# Patient Record
Sex: FEMALE | Race: WHITE | NOT HISPANIC OR LATINO | Employment: OTHER | URBAN - METROPOLITAN AREA
[De-identification: names, ages, dates, MRNs, and addresses within clinical notes are randomized per-mention and may not be internally consistent; named-entity substitution may affect disease eponyms.]

---

## 2019-11-21 ENCOUNTER — TELEPHONE (OUTPATIENT)
Dept: CONSULT | Facility: CLINIC | Age: 74
End: 2019-11-21

## 2019-12-03 NOTE — TELEPHONE ENCOUNTER
Spoke with patient following genetic counseling appointment she attended with her sister. Patient spoke with her insurance and is comfortable having GC only appointment to obtain informed consent for genetic testing for split hand and foot malformation. After the visit we will prior authorize genetic testing for this condition. Yi is comfortable with this plan.

## 2019-12-13 NOTE — TELEPHONE ENCOUNTER
Left non-detailed message on the patient's voicemail.     Plan to discuss:  - We discussed her case further and would like her to see both a  and genetic counselor. Since patient has medicare we know genetic testing is not well covered. Wanting to confirm that if not covered she would be willing to pay $600. Otherwise may be best to start with her son with different insurance that may be more likely to pay.

## 2019-12-20 NOTE — TELEPHONE ENCOUNTER
ROSSM to schedule Ritot appt       APPT notes: Genetics testing for personal and family HX of split hand and foot malformation -ok per Tori (LATESHA)        Thank you   Tavia

## 2020-07-27 ENCOUNTER — TELEPHONE (OUTPATIENT)
Dept: CONSULT | Facility: CLINIC | Age: 75
End: 2020-07-27

## 2020-07-30 ENCOUNTER — VIRTUAL VISIT (OUTPATIENT)
Dept: CONSULT | Facility: CLINIC | Age: 75
End: 2020-07-30
Attending: MEDICAL GENETICS
Payer: MEDICARE

## 2020-07-30 ENCOUNTER — VIRTUAL VISIT (OUTPATIENT)
Dept: CONSULT | Facility: CLINIC | Age: 75
End: 2020-07-30
Attending: GENETIC COUNSELOR, MS
Payer: MEDICARE

## 2020-07-30 DIAGNOSIS — Z84.89 FAMILY HISTORY OF GENETIC DISEASE: ICD-10-CM

## 2020-07-30 DIAGNOSIS — Q71.62: Primary | ICD-10-CM

## 2020-07-30 DIAGNOSIS — H90.3 SENSORINEURAL HEARING LOSS (SNHL) OF BOTH EARS: Primary | ICD-10-CM

## 2020-07-30 DIAGNOSIS — Q71.62: ICD-10-CM

## 2020-07-30 PROCEDURE — 96040 ZZH GENETIC COUNSELING, EACH 30 MINUTES: CPT | Mod: PN,ZF | Performed by: GENETIC COUNSELOR, MS

## 2020-07-30 RX ORDER — FLUTICASONE PROPIONATE 50 MCG
2 SPRAY, SUSPENSION (ML) NASAL
COMMUNITY
Start: 2019-09-25

## 2020-07-30 RX ORDER — ALENDRONATE SODIUM 70 MG/1
70 TABLET ORAL
COMMUNITY

## 2020-07-30 RX ORDER — AZELASTINE 1 MG/ML
1-2 SPRAY, METERED NASAL
COMMUNITY
Start: 2019-05-30

## 2020-07-30 RX ORDER — MONTELUKAST SODIUM 10 MG/1
10 TABLET ORAL
COMMUNITY
Start: 2019-05-30

## 2020-07-30 RX ORDER — ESTRADIOL 0.1 MG/G
1 CREAM VAGINAL
COMMUNITY
Start: 2019-11-04

## 2020-07-30 RX ORDER — SPIRONOLACTONE AND HYDROCHLOROTHIAZIDE 25; 25 MG/1; MG/1
1 TABLET ORAL
COMMUNITY
Start: 2020-06-25 | End: 2021-06-25

## 2020-07-30 RX ORDER — EPINEPHRINE 0.3 MG/.3ML
0.3 INJECTION SUBCUTANEOUS
COMMUNITY
Start: 2020-06-18

## 2020-07-30 RX ORDER — BUSPIRONE HYDROCHLORIDE 7.5 MG/1
7.5 TABLET ORAL
COMMUNITY
Start: 2020-05-06

## 2020-07-30 RX ORDER — IPRATROPIUM BROMIDE 21 UG/1
2 SPRAY, METERED NASAL
COMMUNITY
Start: 2019-09-30

## 2020-07-30 RX ORDER — BUSPIRONE HYDROCHLORIDE 15 MG/1
15 TABLET ORAL
COMMUNITY

## 2020-07-30 RX ORDER — TIZANIDINE HYDROCHLORIDE 2 MG/1
2 CAPSULE, GELATIN COATED ORAL
COMMUNITY

## 2020-07-30 RX ORDER — ATORVASTATIN CALCIUM 40 MG/1
40 TABLET, FILM COATED ORAL
COMMUNITY
Start: 2020-07-23

## 2020-07-30 RX ORDER — OXYBUTYNIN CHLORIDE 10 MG/1
10 TABLET, EXTENDED RELEASE ORAL
COMMUNITY
Start: 2019-09-20

## 2020-07-30 RX ORDER — FAMOTIDINE 20 MG/1
20 TABLET, FILM COATED ORAL
COMMUNITY
Start: 2019-11-26

## 2020-07-30 RX ORDER — GABAPENTIN 100 MG/1
CAPSULE ORAL
COMMUNITY
Start: 2020-06-01

## 2020-07-30 RX ORDER — HYDROCHLOROTHIAZIDE 25 MG/1
25 TABLET ORAL
COMMUNITY

## 2020-07-30 RX ORDER — ACETAMINOPHEN 325 MG/1
TABLET ORAL
COMMUNITY

## 2020-07-30 RX ORDER — MIRABEGRON 25 MG/1
25 TABLET, FILM COATED, EXTENDED RELEASE ORAL
COMMUNITY

## 2020-07-30 RX ORDER — CLINDAMYCIN HCL 300 MG
CAPSULE ORAL
COMMUNITY
Start: 2020-02-10

## 2020-07-30 RX ORDER — TIZANIDINE 2 MG/1
2 TABLET ORAL
COMMUNITY
Start: 2020-04-27

## 2020-07-30 RX ORDER — LOSARTAN POTASSIUM 100 MG/1
100 TABLET ORAL
COMMUNITY
Start: 2020-06-25

## 2020-07-30 NOTE — PROGRESS NOTES
Name:  Yi Cazares  :   1945  MRN:   [id number]  Date of service: 2020  Primary Provider: Castillo Su  Referring Provider: Castillo Su    PRESENTING INFORMATION   Reason for consultation:  A consultation in the [facility] was requested for Yi, a 74 year old female, for evaluation of ectrodactyly. Her unaffected sister, Nedra Ladd, was seen by Tori Hearn previously.     Yi was accompanied to this visit by her sister. Nedra.    History is obtained from Patient, electronic health record and sister. I met with the family at the request of Dr. Shipley to obtain a personal and family history, discuss possible genetic contributions to her symptoms, and to obtain informed consent for genetic testing.     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 She had additional webbing between the thumb and first finger that was released. She also has mild webbing of the right hand. Pictures were obtained today after a media release form was signed. She has hearing loss first noticed in her 40s and she started wearing a hearing aid in her 60s. She did note a history of ear infections requiring ear tubes in 3rd grade. She also has a history of a heart murmur, spondylolisthesis s/p surgeryx3, knee replacement and HTN.     There is no problem list on file for this patient.      Pertinent studies/abnormal test results:   None    Imaging results:   No results found for this or any previous visit (from the past 744 hour(s)).  No results found for any visits on 20.  No results found for this or any previous visit (from the past 8760 hour(s)).     Past Medical History:  No past medical history on file.    Past Surgical History:  No past surgical history on file.    Care team:  Patient Care Team       Relationship Specialty Notifications Start Castillo Salgado PCP - General Internal Medicine  3/13/20     Phone: [phone] Fax: [phone]          [address] [address]          She is coming in to discuss her family history of  split hand and foot malformation in her mother, son, and grandchildren. She has questions regarding recurrence risk.      Family History: A three generation pedigree was obtained and scanned into the electronic medical record. The relevant portions are described below:       Nieces/nephews: One male with psoriasis. One daughter without health/development concerns.     Siblings- sister, Nedra is a 68 yo female with a personal history of atopic dermatitis, osteopenia, osteoarthritis, and hyperlipidemia. She has two unaffected children    Son, Yuriy Cazares, 39y, ectrodactyly of right and left hands and clefting/webbing of right and left feet. He had a normal kidney evaluation.    Granddaughter, Ashley Cazares, 6y with ectrodactyly of right and left hands and clefting/webbing of right and left feet; normal kidney evaluation. Granddaughter, Cindy Cazares, 20 months, no limb malformations.     Grandson, Yuriy Cazares, 5.5 months, with ectrodactyly of right and left hands (no thumbs), missing radius, abnormal wrist joint preventing him from extension of hand, and clefting/webbing of right and left feet.     Mother-  at 85y from sepsis from a cold. H/o ectrodactyly of left hand and webbing of 3-4 fingers on right hand. H/o hearing loss when she was older, heart murmur, and venous problems of her legs.     Maternal Relatives- uncle  of MI. Two aunts had venous problems of their legs. Grandfather with h/o eye cancer of one eye s/p removal, and hearing loss. Grandmother with hearing loss and venous problems in legs    Father-  at 78y of metastatic prostate cancer with h/o glaucoma and multiple MI.     Paternal Relatives- Three aunts with breast cancer diagnosed in their 50s or 60s. One uncle with stomach cancer diagnosed in his late 40s. Another uncle  at 42 from MI. First cousin  of  a heart problem. Another first cousin with h/o stroke and multiple bone ?fractures (surgeries). Grandmother with DMI and h/o related deafness, blindness, and loss of sense of touch.      Family history is otherwise largely non-contributory. There were no other reports of limb, skeletal, cardiac, teeth, or skin anomalies. There were also no other reports of vision/hearing concerns, birth defects, developmental delay, intellectual disability, recurrent pregnancy loss, or stillbirth.      DISCUSSION AND ASSESSMENT  Today we reviewed that our genetic material or DNA is responsible for how our bodies grow and develop. It can be thought of as an instruction manual. This instruction manual is made up of chapters called genes. Our genes are inherited on structures called chromosomes, of which we have 23 pairs for a total of 46. For each chromosome pair, one copy is inherited from the mother and one is inherited from the father. The chromosome pairs are numbered from 1 to 22, and the 23rd pair of chromsomes is called the sex chromsomes. These determine if we are a male or female.     Changes in the DNA sequence of a gene can cause the signs and symptoms of a genetic condition because the instructions it is providing to the body have been altered. This can be a small spelling error in the gene, a large duplicated piece of information, or a large missing piece of information.      We discussed that split hand and foot malformation (SHFM) can be isolated, or syndromic. In syndromic forms there are other health concerns such as ectodermal dysplasia features, or hearing loss. SHFM has been documented to follow autosomal dominant, autosomal recessive, and X-linked inheritance patterns. Variable expressivity and incomplete penetrance have been reported with SHFM. Individuals with the same genetic change in a family can have varying degrees of limb anomalies, some may even be unaffected.       In Nedra's family there are four  generations with individuals who have some level of limb malformation. In addition, there is documented male to male transmission. This information provides evidence to support an autosomal dominant inheritance pattern. There is also a family history of hearing loss in the patient's sister and mother who have SHFM, but hearing loss is also present in maternal grandmother and grandfather who do not have SHFM. It is unclear if the hearing loss could be part of a SHFM syndrome with variable expressivity, or if it is a separate symptom that is associated with aging.       Nedra's concerns focused specifically on her children and the chances they could have a child with SHFM. The fact that Nedra and both her children are unaffected is reassuring and provides evidence supporting a low recurrence risk. However, due to incomplete penetrance we cannot completely rule out the possibility of recurrence based on lack of phenotypic features.      Genetic Testing  There is genetic testing available to test for known genetic causes of SHFM. Limb anomalies and reduction defects panel from [employer] that includes: ANKRD11, LMSIQG12, ARID1A, ARID1B, BHLHA9, BMP2, BMPR1B, CC2D2A, CDH3, WPJ671, CHSY1,DLL4, DLX5, DOCK6, DVL1, DVL3, **WBNE2U8, EOGT, ESCO2, FGF10, FGF16, FGFR1, FGFR2, FGFR3, GDF5, GLI3, GNAS, HDAC4, HDAC8, HOXD13, IHH, KIF7, KMT2A, LMBR1 (including ZRS regulatory region), LRP4, MGP, MKS1, MYCN, NIPBL, NOG, NOTCH1, NSDHL, PHF6, PIGV, PTHLH, RAD21, RBPJ, RECQL4, RBM8A, ROR2, CCJSFN7A, SALL1, SALL4, SHH, SMARCA2, SMARCA4, SMARCB1, SMARCE1, SMC1A, SMC3, SOX11, SOX9, TBX15, TBX3, TBX5, THPO, TP63, WNT10B, WNT3, WNT5A, WNT7A and deletion/duplication coverage for 10q24.    We discussed the benefits and limitations of doing this panel vs exome. Patient elected to proceed with exome.     Discussion  Whole Exome Sequencing  We spent time reviewing Yi s history, and that previous testing was not able to provide a specific  diagnosis or explanation for Yi s medical history.  We reviewed that based on the genetic testing results up to this point in time, we are not able to offer specific testing for a known condition for Yi.  However, we discussed broader testing through Whole Exome Sequencing (ALEKSEY) to look for a possible underlying cause for Yi's symptoms.    We discussed how ALEKSEY looks at the exome or the coding parts of the genes to look for gene changes that may explain Yi's symptoms.  ALEKSEY will also evaluated the DNA that is contained in the mitochondria (mtDNA).  We reviewed that ALEKSEY will not look at every part of the genome that can cause disease.  In addition, not all of the exons that are targeted by ALEKSEY will be covered or evaluated at a high enough level to accurately detect a disease causing mutation.  There are also limits to the types of disease-causing gene mutations that ALEKSEY can detect.  It is possible that a genetic cause for Yi's symptoms may be present and not detected by this test.     ALEKSEY Results  We reviewed that there are three types of results that can be obtained from ALEKSEY:    One possibility is a change(s) could be seen in Yi and this change(s) is known to cause similar symptoms to the symptoms Yi has experienced.  This is considered a positive result.  A positive result may provide more information on appropriate clinical management for Yi and may provide information on additional potential health risks associated with Yi's diagnosis.  A positive result can also have implications for the health and reproductive risks of other relatives.    It is also possible that no change(s) that are likely to explain Yi's symptoms are found from ALEKSEY.  This is considered a negative result.  A negative result would not completely rule out a possible genetic cause for Yi's symptoms. If negative, consider CNV analysis of 10q24.    Not all changes in our genes cause disease.  Sometimes, it can be difficult for  the laboratory to determine whether or not a change that is found contributes to the patient's symptoms.  If the meaning of a particular gene change is unknown, the lab classifies the result as a variant of unknown significance.    Familial Samples  We discussed that samples from Yi's family will be included in the analysis to help determine if gene changes that are found are disease causing or benign.  Only changes that are found in Yi that may contributed to her symptoms will be tested for in her relatives and only gene changes that the laboratory believes may contribute to Yi's symptoms will be reported. Genetic testing in relatives can lead to diagnoses, carrier status, or reveal family relationships (e.g. nonpaternity). Changes and variants in genes that are not thought to contribute to Yi's symptoms will not be included in the results report and will not be tested for in her relatives.     Children  Yuriy Cazares  1980 (affected)    Grandchildren  Ashley Cazares  10/24/2013 (affected)  Rosie Cazares  3/17/2018 (unaffected)  Yuriy Cazares  6/3/2019 (affected)    Sibling  Fawn Ladd  1952 (unaffected)    Niece/Nephew  Bee Martinsmons  10/14/1993 (unaffected)  Diallo Martinsmons  10/26/1981 (unaffected)    ACMG Secondary Findings  We reviewed that the lab can report the results of gene mutations that are found in genes recommended by the American College of Medical Genetics and Genomics (ACMG) to be reported to ALEKSEY patients even if the gene variant does not contribute to their current symptoms.  Many of these gene changes may not be associated with symptoms until adulthood and are not traditionally tested for in children, but may lead to medical management changes. Examples include genes related to increased cancer risk and heart arrhythmias. In addition, relative status for a change in one of the secondary findings genes may sought from Yi's results.   "    We discussed that there are insurance implications related to these findings in terms of life, short term disability, and long term disability insurance. There is a federal law in place at the moment, The Genetic Information Nondiscrimination Act or PINA (2008) that protects again health insurance discrimination.  Health insurance protections do not apply to members of the [place]  who receive care through [employer], Veterans receiving care through the VA, the [facility], or federal employees who receive care through Federal Employees Health Benefits Plan. Employers may not discriminate (hiring, firing, promotions etc.), based on genetic information. This only applies to companies with 15 or more employees. It does not apply to federal employees, or , which have their own nondiscrimination protections in place. Employers may have \"voluntary\" health services such as employee wellness programs that request genetic information or family history, which is not a violation of PINA.   At this time, the family decided to decline receiving the results from the ACMG secondary findings.     Benefits Investigation and Initiating Testing  We reviewed the potential costs of ALEKSEY and discussed that the lab will look into the costs of testing through the family's insurance on their behalf.  This is called an estimation of benefits. We reviewed that they will be contacted by [employer] with this information, but they should also check this information with their insurance company. This estimation is not guaranteed. The family has the right to decline to proceed with testing based on the benefits investigation. If [facility] does not receive permission from the family to proceed with testing, testing will not be initiated. If the benefits investigation is too high for the family, [facility] offers financial assistance based on house-hold income and household size. They may also switch to the patient-pay price of $2500, " "which can be paid over 24 months.  If the estimation of benefits is <$100, the family will not be contacted and testing will be automatically initiated.     Family History of Cancer   The family history of prostate, breast, and stomach cancer is concerning for an inherited genetic risk for cancer. While the majority of cancer is sporadic in nature, some families carry a genetic predisposition to cancer. These families have a clustering of cancer in the family and/or early ages of onset. Along with the cancer types already seen in the family, the risk for other types of cancer may also be increased. We discussed the purpose of genetic testing and the changes in management that could be made based on genetic testing, personal, and family history. We discussed that the best individual to test is an individual who has been diagnosed with cancer. We discussed the Cancer Risk Management Program, and their contact information was provided if relatives are interested.  They can ask for a referral from their primary care provider.    Cancer Risk Management  o [phone]  o [email or web address]     If relatives are not local and need assistance finding a genetic counselor in their area, they can use the [email or web address] \"find a genetic counselor\" link.    Plan:  1. The purpose and process of whole exome sequencing (ALEKSEY) was reviewed today.  2. After reviewing the risks, benefits, and limitations of genetic testing, consent was obtained for whole exome sequencing for Yi.  A buccal sample. Yi's sister, Nedra, parents consented to providing a buccal sample to assist in the interpretation of Yi's results. I will call other relatives 8/5/2020 to consent for testing.  They are aware that they will not be charged for the parental studies; however, a bill may be received for lab services.  Orders Placed This Encounter   Procedures     SEND OUT to [facility] for XomeDxPlus Trio Test " Code 561a DO NOT BILL PATIENT: Laboratory Miscellaneous Order     3. Results are expected in 4-6 months, pending [facility]'s benefits investigation. These will be disclosed over the phone, and a follow up appointment will be made to discuss results in further detail.  4. Contact information was provided to the family.  Additional questions or concerns were denied.      Kamilah Alonso [facility]  Genetic Counselor   [facility]   Phone: [phone]  Pager: [phone]  Email: [email or web address]        Approximate Time Spent in Consultation: 90min      Addendum 8/5/2020: consented Diallo for testing (Fawn's son). Yuriy and Bee were not available by phone. I will continue to try to reach them.    Addendum 8/13/2020: consented Yuriy Cazares for testing along with his three children, Ashley, Cindy, and Yuriy Cazares. Consented Bee Ladd for testing.    Addendum 8/25/2020: received VM from Yi stating they are sending in the buccals. Also confirmed that I have received her letter with suggested family history edits. Edits made above.    CC: Patient

## 2020-07-30 NOTE — PROGRESS NOTES
GENETICS CLINIC CONSULTATION     Name:  Yi Cazares  :   1945  MRN:   [id number]  Date of service: 2020  Primary Care Provider: Castillo Su  Referring Provider: Castillo Su    Dear Dr. Castillo Su,     We had the pleasure of seeing your patient in Genetics Clinic today via video visit.     Reason for consultation:  A consultation in the [facility] was requested for Yi, a 74 year old female, for evaluation of congenital hand malformation.     Yi was accompanied to this visit by her sister. She also saw our genetic counselor at this visit.       History is obtained from Patient, electronic health record and sister.    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 Her family has been affected for 4 generations and they are concerned that the condition is worsening as the generations progress and are additionally worried that unaffected individuals may be carriers. We discussed the pattern of inheritance in the family is most consistent with AD.    Ectrodactyly or split-hand/foot malformations (SHFM) is a rare congenital malformation of the limbs, involving mostly the central rays of the autopods, median clefts in hands and feet, syndactyly, and metacarpal, metatarsal and phalangeal aplasia or hypoplasia. The hands and/or feet appear split into 2 halves with aplasia (failure of development) of the phalanx, metacarpal, and/or metatarsal bones of one or more fingers and/or toes as well as hypoplasia (underdevelopment) of the phalanges, metacarpals, and metatarsals (the bones leading up to the toes). SHFM is inherited as an autosomal dominant, recessive, or X-linked entity, and its clinical severity varies from patient to patient as well as between the limbs of the same patient. SHFM is a genetically heterogeneous disorder with variable expressivity inherited as syndromic and nonsyndromic forms.    We discussed the  different options between obtaining a NGS panel looking for known genes (DLX5/DLX6/DSS1, HOX11, HOXD13, TP63, LOUISA, WNT10B, BHLHA9) associated with split hand/ foot malformations vs. exome sequencing with Yi as proband and some other affected and unaffected family members that will help with seggregation. Genetic counseling was provided by the  and advantages and disadvantages of each test were discussed in detail. At this time the family would like to pursue exome sequencing.    Yi also inquire about any gene therapy. I discussed there was none at this time, but option of PGD once we have the familial mutation was discussed in detail.     Plan:    1. Ordered at this visit: none   2. Genetic testing: Prior-auth for exome sequencing.   3. Genetic counseling consultation with Kamilah Alonso MS, [facility] to update pedigree, provide genetic counseling regarding ectrodactyly, obtain consent for genetic testing and coordinate testing of family members  4. Follow up:  Return in about 6 months (around 1/30/2021).    References:  https://www.[employer]/Article/FullText/123362#:~:text=Ectrodactyly%20or%20split%2Dhand%2Ffoot,and%20phalangeal%20aplasia%20or%20hypoplasia.  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 She has had multiple surgeries on her hands. She was told in 1968 that her condition was autosomal dominant when she went to [facility], but their family has been told more recently that this condition may skip generations so they are seeking further genetic testing to better understand their condition and to see if future generations may be affected even if they don't appear symptomatic.     They are concerned that the condition is getting severe through the affected generations. Yi and her mom both have their left hand affected. Yi's son, along with two of her  "grandchildren have both hands and feet affected. She also has an unaffected grandchild. Yi's sister Nedra is on the call today and has also been seen by a genetic counselor before. She and her two children are unaffected. Nedra is interested to see if her future grandchildren are at risk. They desire testing for many of the affected and nonaffected family members.    Yi denies personal history of delayed development, early onset hearing loss or foot malformation.     Developmental/Educational History:  Yi denies any history of development issues or cognitive delays.   Yi got her doctorate in education.     Pregnancy/ History:  Yi was born via vaginal delivery  Pregnancy complications included \"blue at birth\" but did not need to stay longer a typical baby.     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 - Second toe removed in both of feet.   - right knee replaced  - left knee shattered and woven back together  - cataract surgery    Medications:  Current Outpatient Medications   Medication Sig Dispense Refill     acetaminophen (TYLENOL) 325 MG tablet        albuterol (PROAIR RESPICLICK) 108 (90 Base) MCG/ACT inhaler Inhale 2 puffs into the lungs       alendronate (FOSAMAX) 70 MG tablet Take 70 mg by mouth       atorvastatin (LIPITOR) 40 MG tablet Take 40 mg by mouth       azelastine (ASTELIN) 0.1 % nasal spray 1-2 sprays       busPIRone (BUSPAR) 15 MG tablet Take 15 mg " "by mouth       busPIRone (BUSPAR) 7.5 MG tablet Take 7.5 mg by mouth       cholecalciferol 25 MCG (1000 UT) TABS Take 1,000 Units by mouth       clindamycin (CLEOCIN) 300 MG capsule Take 2 capsules by mouth 1-2 hours prior to a dental procedure.       EPINEPHrine (ANY BX GENERIC EQUIV) 0.3 MG/0.3ML injection 2-pack Inject 0.3 mg into the muscle       estradiol (ESTRACE) 0.1 MG/GM vaginal cream Place 1 g vaginally       famotidine (PEPCID) 20 MG tablet Take 20 mg by mouth       fluticasone (FLONASE) 50 MCG/ACT nasal spray 2 sprays       gabapentin (NEURONTIN) 100 MG capsule TAKE TWO CAPSULES BY MOUTH TWICE A DAY       hydrochlorothiazide (HYDRODIURIL) 25 MG tablet Take 25 mg by mouth       ipratropium (ATROVENT) 0.03 % nasal spray 2 sprays       losartan (COZAAR) 100 MG tablet Take 100 mg by mouth       mirabegron (MYRBETRIQ) 25 MG 24 hr tablet Take 25 mg by mouth       montelukast (SINGULAIR) 10 MG tablet Take 10 mg by mouth       omeprazole (PRILOSEC) 20 MG DR capsule TAKE ONE CAPSULE BY MOUTH TWICE A DAY, 1 HOUR BEFORE A MEAL       oxybutynin ER (DITROPAN-XL) 10 MG 24 hr tablet Take 10 mg by mouth       ranitidine (ZANTAC) 150 MG tablet Take 150 mg by mouth       spironolactone-HCTZ (ALDACTAZIDE) 25-25 MG tablet Take 1 tablet by mouth       tiZANidine (ZANAFLEX) 2 MG capsule Take 2 mg by mouth       tiZANidine (ZANAFLEX) 2 MG tablet Take 2 mg by mouth         Allergies:  Allergies   Allergen Reactions     Bee Venom Anaphylaxis     Moxifloxacin Hives     Oxycodone Anaphylaxis     Shellfish Allergy Anaphylaxis and Hives     Amlodipine Other (See Comments)     Leg swelling      Cyclosporine Unknown and Other (See Comments)     Painful redness on lids        Doxazosin Other (See Comments)     intolerance  Ankle swelling       Hydrocodone-Acetaminophen Other (See Comments)     Mushroom Extract Complex GI Disturbance     Neosporin Hives     \"Many antibiotics\"     No Clinical Screening - See Comments Hives     Narcotic " and synthetic narcotics.     Tramadol Nausea and Vomiting     Wasp Venom Protein Hives     Venom of stinging insects     Codeine Rash and Nausea and Vomiting     Morphine Rash, Hives, Unknown and Nausea     vicodin  Redness in face  REDNESS IN FACE       Propoxyphene Swelling and Rash     Redness  DARVON       Rofecoxib Other (See Comments) and Rash     STOMACH UPSET       Sulfa Drugs Rash       Diet:  Regular     Care team:  Patient Care Team:  Castillo Su PCP - General (Internal Medicine)    Review of Systems:  GENERAL:  Endorses occasional headache.  EYES:  Endorses hx of cataract surgery. Uses glasses.   EARS: high frequency hearing loss  NOSE/MOUTH/THROAT: Denies: Epistaxis, Difficulty swallowing, facial weakness  RESPIRATORY: Denies: Cough, shortness of breath  CARDIOVASCULAR: Denies: chest pain  GASTROINTESTINAL: nausea with fatty foods. Denies diarrhea, vomiting, constipation   MUSCULOSKELETAL:  Denies: fractures, see HPI and surgical history  RENAL/URINARY: frequent UTIs in past, but treated with medication and resolved. Denies: Hx of kidney stones,  Hematuria,    NEUROLOGICAL: Denies: Hx of seizures or tremor  ENDOCRINE: Denies: Thyroid problems  INTEGUMENT: Denies: Birthmarks, Rashes, pigmentation issues, Poor wound healing    Family History:    A detailed pedigree was obtained by the genetic counselor at the time of Nedra's appointment and is scanned into the electronic medical record. I personally reviewed and discussed the pedigree with the GC and the family and concur with the GC note. Please refer to the formal pedigree for full details.     Yi and her mom both have their left hand affected. Yi's son, along with two of her grandchildren have both hands and feet affected . Yi's sister Nedra is on the call today and has also been seen by a genetic counselor before. She and her two children are unaffected.      Son- 39y, ectrodactyly of right and left hands and clefting/webbing of right  and left feet. He had a normal kidney evaluation.    Granddaughter/ grandson- grand daughter, 6y with ectrodactyly of right and left hands and clefting/webbing of right and left feet; normal kidney evaluation. Another grand daughter, 20 months, atopic dermatitis and no limb malformations. Grandson, 5.5 months, with ectrodactyly of right and left hands (no thumbs), missing radius, abnormal wrist joint preventing him from extension of hand, and clefting/webbing of right and left feet.    Mother-  at 85y from sepsis from a cold. H/o ectrodactyly of left hand and webbing of 3-4 fingers on right hand. H/o hearing loss when she was older requiring hearing aids, heart murmur, and venous problems of her legs.       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 Now 's grandchildren.    Physical Examination:  There were no vitals taken for this visit.  Weight %tile:Normalized weight-for-age data not available for patients older than 20 years.  Height %tile: Normalized stature-for-age data not available for patients older than 20 years.  Head Circumference %tile: No head circumference on file for this encounter.  BMI %tile: No height and weight on file for this encounter.    Pictures taken during the visit: no pictures sent ahead of time via e-mail.     GENERAL: Healthy, alert and no distress  EYES: Eyes grossly normal to inspection.  No discharge or erythema, or obvious scleral/conjunctival abnormalities. Uses glasses.   NOSE: normal, no discharge  RESP: No audible wheeze, cough, or visible cyanosis.  No visible retractions or increased work of breathing.    SKIN: Visible skin clear. No significant rash, abnormal pigmentation or lesions.  NEURO: Cranial nerves grossly intact.  Mentation and speech appropriate for age.  Extremities/Musculoskeletal:      Right hand: Cutaneous webbing between third and fourth fingers of right hand.     Left hand: normal fifth digit. Third and fourth finger fused,  prominent knuckle with time, Index finger extends only to first knuckle. Thumb is smaller than    right hand thumb, but functions fine. Web between thumb and first finger was cut at 18 years old to improve function.   PSYCH: Mentation appears normal, affect normal/bright, judgement and insight intact, normal speech and appearance well-groomed.    Genetic testing done to date:  none    Pertinent lab results:   N/A    Imaging results:  none    Thank you for allowing us to participate in the care of Yi Cazares. Please do not hesitate to contact us with questions.    Monet Hopper, MS4      Physician Attestation   I, Lidia Shipley, was present with the medical student who participated in the service and in the documentation of the note.  I have edited the note, verified the history and personally performed the physical exam and medical decision making.  I agree with the assessment and plan of care as documented in the note.      Items personally reviewed: growth parameters, labs and imaging and agree with the interpretation documented in the note.    I spent a total of 60 minutes face-to-face with Yi Cazares and her sister during today's video visit.     Lidia Shipley MD    Division of Genetics and Metabolism  Department of Pediatrics      Route to  Patient Care Team:  Castillo Su as PCP - General (Internal Medicine)      Video-Visit Details     Type of service:  Video Visit     Video Start Time: 2:35 PM    Video End Time (time video stopped): 3:35 PM    Originating Location (pt. Location): Patient states she is in [place] for the visit     Distant Location (provider location):  PEDS GENETICS      Mode of Communication:  Video Conference via [employer]

## 2020-07-30 NOTE — LETTER
2020      RE: Yi Cazares  [address] [facility] [address]       Name:  Yi Cazares  :   1945  MRN:   [id number]  Date of service: 2020  Primary Provider: Castillo Su  Referring Provider: Castillo Su    PRESENTING INFORMATION   Reason for consultation:  A consultation in the [facility] was requested for Yi, a 74 year old female, for evaluation of ectrodactyly. Her unaffected sister, Nedra Ladd, was seen by Tori Hearn previously.     Yi was accompanied to this visit by her sister. Nedra.    History is obtained from Patient, electronic health record and sister. I met with the family at the request of Dr. Shipley to obtain a personal and family history, discuss possible genetic contributions to her symptoms, and to obtain informed consent for genetic testing.     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 She had additional webbing between the thumb and first finger that was released. She also has mild webbing of the right hand. Pictures were obtained today after a media release form was signed. She has hearing loss first noticed in her 40s and she started wearing a hearing aid in her 60s. She did note a history of ear infections requiring ear tubes in 3rd grade. She also has a history of a heart murmur, spondylolisthesis s/p surgeryx3, knee replacement and HTN.     There is no problem list on file for this patient.      Pertinent studies/abnormal test results:       Imaging results:   No results found for this or any previous visit (from the past 744 hour(s)).  No results found for any visits on 20.  No results found for this or any previous visit (from the past 8760 hour(s)).     Past Medical History:  No past medical history on file.    Past Surgical History:  No past surgical history on file.    Care team:  Patient Care Team       Relationship Specialty Notifications Start End    Castillo Su PCP -  General Internal Medicine  3/13/20     Phone: [phone] Fax: [phone]         [address] [facility] [address]          She is coming in to discuss her family history of  split hand and foot malformation in her mother, sister, nephew, and great niece and nephew. She has questions regarding recurrence risk for her unaffected children and great niece.      Family History: A three generation pedigree was obtained and scanned into the electronic medical record. The relevant portions are described below:       Nieces/nephews: One male with psoriasis. One daughter without health/development concerns.     Siblings- sister, Nedra is a 66 yo female with a personal history of atopic dermatitis, osteopenia, osteoarthritis, and hyperlipidemia. She has two unaffected children    Son 39y, ectrodactyly of right and left hands and clefting/webbing of right and left feet. He had a normal kidney evaluation.    Granddaughter, 6y with ectrodactyly of right and left hands and clefting/webbing of right and left feet; normal kidney evaluation. Granddaughter, 20 months, atopic dermatitis and no limb malformations. Grandson, 5.5 months, , with ectrodactyly of right and left hands (no thumbs), missing radius, abnormal wrist joint preventing him from extension of hand, and clefting/webbing of right and left feet.     Mother-  at 85y from sepsis from a cold. H/o ectrodactyly of left hand and webbing of 3-4 fingers on right hand. H/o hearing loss when she was older requiring hearing aids, heart murmur, and venous problems of her legs.     Maternal Relatives- uncle  of MI. Two aunts had venous problems of their legs. Grandfather with h/o eye cancer of one eye s/p removal, and hearing loss. Grandmother with hearing loss and venous problems in legs    Father-  at 78y of metastatic prostate cancer with h/o glaucoma and multiple MI.     Paternal Relatives- Three aunts with breast cancer diagnosed in their 50s or  60s. One uncle with stomach cancer diagnosed in his late 40s. Another uncle  at 42 from MI. First cousin  of a heart problem. Another first cousin with h/o stroke and multiple bone fractures. Grandmother with DMI and h/o related deafness, blindness, and loss of sense of touch.      Family history is otherwise largely non-contributory. There were no other reports of limb, skeletal, cardiac, teeth, or skin anomalies. There were also no other reports of vision/hearing concerns, birth defects, developmental delay, intellectual disability, recurrent pregnancy loss, or stillbirth.      DISCUSSION AND ASSESSMENT  Today we reviewed that our genetic material or DNA is responsible for how our bodies grow and develop. It can be thought of as an instruction manual. This instruction manual is made up of chapters called genes. Our genes are inherited on structures called chromosomes, of which we have 23 pairs for a total of 46. For each chromosome pair, one copy is inherited from the mother and one is inherited from the father. The chromosome pairs are numbered from 1 to 22, and the 23rd pair of chromsomes is called the sex chromsomes. These determine if we are a male or female.     Changes in the DNA sequence of a gene can cause the signs and symptoms of a genetic condition because the instructions it is providing to the body have been altered. This can be a small spelling error in the gene, a large duplicated piece of information, or a large missing piece of information.      We discussed that split hand and foot malformation (SHFM) can be isolated, or syndromic. In syndromic forms there are other health concerns such as ectodermal dysplasia features, or hearing loss. SHFM has been documented to follow autosomal dominant, autosomal recessive, and X-linked inheritance patterns. Variable expressivity and incomplete penetrance have been reported with SHFM. Individuals with the same genetic change in a family can have  varying degrees of limb anomalies, some may even be unaffected.       In Nedra's family there are four generations with individuals who have some level of limb malformation. In addition, there is documented male to male transmission. This information provides evidence to support an autosomal dominant inheritance pattern. There is also a family history of hearing loss in the patient's sister and mother who have SHFM, but hearing loss is also present in maternal grandmother and grandfather who do not have SHFM. It is unclear if the hearing loss could be part of a SHFM syndrome with variable expressivity, or if it is a separate symptom that is associated with aging.      Nedra's concerns focused specifically on her children and the chances they could have a child with SHFM. The fact that Nedra and both her children are unaffected is reassuring and provides evidence supporting a low recurrence risk. However, due to incomplete penetrance we cannot completely rule out the possibility of recurrence based on lack of phenotypic features.      Genetic Testing  There is genetic testing available to test for known genetic causes of SHFM. Limb anomalies and reduction defects panel from [facility] that includes: ANKRD11, LUWCEL01, ARID1A, ARID1B, BHLHA9, BMP2, BMPR1B, CC2D2A, CDH3, OWU130, CHSY1,DLL4, DLX5, DOCK6, DVL1, DVL3, **IZVX1K9, EOGT, ESCO2, FGF10, FGF16, FGFR1, FGFR2, FGFR3, GDF5, GLI3, GNAS, HDAC4, HDAC8, HOXD13, IHH, KIF7, KMT2A, LMBR1 (including ZRS regulatory region), LRP4, MGP, MKS1, MYCN, NIPBL, NOG, NOTCH1, NSDHL, PHF6, PIGV, PTHLH, RAD21, RBPJ, RECQL4, RBM8A, ROR2, ZZYERE5D, SALL1, SALL4, SHH, SMARCA2, SMARCA4, SMARCB1, SMARCE1, SMC1A, SMC3, SOX11, SOX9, TBX15, TBX3, TBX5, THPO, TP63, WNT10B, WNT3, WNT5A, WNT7A and deletion/duplication coverage for 10q24.    We discussed the benefits and limitations of doing this panel vs exome. Patient elected to proceed with exome.     Discussion  Whole Exome Sequencing  We  spent time reviewing Yi s history, and that previous testing was not able to provide a specific diagnosis or explanation for Yi s medical history.  We reviewed that based on the genetic testing results up to this point in time, we are not able to offer specific testing for a known condition for Yi.  However, we discussed broader testing through Whole Exome Sequencing (ALEKSEY) to look for a possible underlying cause for Yi's symptoms.    We discussed how ALEKSEY looks at the exome or the coding parts of the genes to look for gene changes that may explain Yi's symptoms.  ALEKSEY will also evaluated the DNA that is contained in the mitochondria (mtDNA).  We reviewed that ALEKSEY will not look at every part of the genome that can cause disease.  In addition, not all of the exons that are targeted by ALEKSEY will be covered or evaluated at a high enough level to accurately detect a disease causing mutation.  There are also limits to the types of disease-causing gene mutations that ALEKSEY can detect.  It is possible that a genetic cause for Yi's symptoms may be present and not detected by this test.     ALEKSEY Results  We reviewed that there are three types of results that can be obtained from ALEKSEY:    One possibility is a change(s) could be seen in Yi and this change(s) is known to cause similar symptoms to the symptoms Yi has experienced.  This is considered a positive result.  A positive result may provide more information on appropriate clinical management for Yi and may provide information on additional potential health risks associated with Yi's diagnosis.  A positive result can also have implications for the health and reproductive risks of other relatives.    It is also possible that no change(s) that are likely to explain Yi's symptoms are found from ALEKSEY.  This is considered a negative result.  A negative result would not completely rule out a possible genetic cause for Yi's symptoms. If negative, consider CNV  analysis of 10q24.    Not all changes in our genes cause disease.  Sometimes, it can be difficult for the laboratory to determine whether or not a change that is found contributes to the patient's symptoms.  If the meaning of a particular gene change is unknown, the lab classifies the result as a variant of unknown significance.    Familial Samples  We discussed that samples from Yi's family will be included in the analysis to help determine if gene changes that are found are disease causing or benign.  Only changes that are found in Yi that may contributed to her symptoms will be tested for in her relatives and only gene changes that the laboratory believes may contribute to Yi's symptoms will be reported. Genetic testing in relatives can lead to diagnoses, carrier status, or reveal family relationships (e.g. nonpaternity). Changes and variants in genes that are not thought to contribute to Yi's symptoms will not be included in the results report and will not be tested for in her relatives.     ACMG Secondary Findings  We reviewed that the lab can report the results of gene mutations that are found in genes recommended by the American College of Medical Genetics and Genomics (ACMG) to be reported to ALEKSEY patients even if the gene variant does not contribute to their current symptoms.  Many of these gene changes may not be associated with symptoms until adulthood and are not traditionally tested for in children, but may lead to medical management changes. Examples include genes related to increased cancer risk and heart arrhythmias. In addition, relative status for a change in one of the secondary findings genes may sought from Yi's results.      We discussed that there are insurance implications related to these findings in terms of life, short term disability, and long term disability insurance. There is a federal law in place at the moment, The Genetic Information Nondiscrimination Act or PINA (2008)  "that protects again health insurance discrimination.  Health insurance protections do not apply to members of the [place]  who receive care through [employer], Veterans receiving care through the VA, the [facility] Service, or federal employees who receive care through Federal Employees Health Benefits Plan. Employers may not discriminate (hiring, firing, promotions etc.), based on genetic information. This only applies to companies with 15 or more employees. It does not apply to federal employees, or , which have their own nondiscrimination protections in place. Employers may have \"voluntary\" health services such as employee wellness programs that request genetic information or family history, which is not a violation of PINA.   At this time, the family decided to decline receiving the results from the ACMG secondary findings.     Benefits Investigation and Initiating Testing  We reviewed the potential costs of ALEKSEY and discussed that the lab will look into the costs of testing through the family's insurance on their behalf.  This is called an estimation of benefits. We reviewed that they will be contacted by [facility] with this information, but they should also check this information with their insurance company. This estimation is not guaranteed. The family has the right to decline to proceed with testing based on the benefits investigation. If [facility] does not receive permission from the family to proceed with testing, testing will not be initiated. If the benefits investigation is too high for the family, [facility] offers financial assistance based on house-hold income and household size. They may also switch to the patient-pay price of $2500, which can be paid over 24 months.  If the estimation of benefits is <$100, the family will not be contacted and testing will be automatically initiated.     Family History of Cancer   The family history of pancreatic, breast, and stomach cancer is concerning for an " "inherited genetic risk for cancer. While the majority of cancer is sporadic in nature, some families carry a genetic predisposition to cancer. These families have a clustering of cancer in the family and/or early ages of onset. Along with the cancer types already seen in the family, the risk for other types of cancer may also be increased. We discussed the purpose of genetic testing and the changes in management that could be made based on genetic testing, personal, and family history. We discussed that the best individual to test is an individual who has been diagnosed with cancer. We discussed the Cancer Risk Management Program, and their contact information was provided if relatives are interested.  They can ask for a referral from their primary care provider.    Cancer Risk Management  o [phone]  o [email or web address]/cancer-[email or web address]-management-program     If relatives are not local and need assistance finding a genetic counselor in their area, they can use the www.ns.org \"find a genetic counselor\" link.    Plan:  1. The purpose and process of whole exome sequencing (ALEKSEY) was reviewed today.  2. After reviewing the risks, benefits, and limitations of genetic testing, consent was obtained for whole exome sequencing for Yi.  A buccal sample. Yi's sister, Nedra, parents consented to providing a buccal sample to assist in the interpretation of Yi's results. I will call other relatives 8/5/2020 to consent for testing.  They are aware that they will not be charged for the parental studies; however, a bill may be received for lab services.  No orders of the defined types were placed in this encounter.    3. Results are expected in 4-6 months, pending [employer]'s benefits investigation. These will be disclosed over the phone, and a follow up appointment will be made to discuss results in further detail.  4. Contact information was provided to the family.  Additional questions or concerns were " denied.      Kamilah Alonso [facility]  Genetic Counselor   [facility]   Phone: [phone]  Pager: [phone]  Email: [email or web address]        Approximate Time Spent in Consultation: 90min      CC: Patient      Yi Cazares is a 74 year old female who is being evaluated via a billable video visit.

## 2020-07-30 NOTE — PROGRESS NOTES
"Yi Cazares is a 74 year old female who is being evaluated via a billable video visit.      The patient has been notified of following:     \"This video visit will be conducted via a call between you and your physician/provider. We have found that certain health care needs can be provided without the need for an in-person physical exam.  This service lets us provide the care you need with a video conversation.  If a prescription is necessary we can send it directly to your pharmacy.  If lab work is needed we can place an order for that and you can then stop by our lab to have the test done at a later time.    Video visits are billed at different rates depending on your insurance coverage.  Please reach out to your insurance provider with any questions.    If during the course of the call the physician/provider feels a video visit is not appropriate, you will not be charged for this service.\"    Patient has given verbal consent for Video visit? Yes  How would you like to obtain your AVS? MyChart  If you are dropped from the video visit, the video invite should be resent to: Send to e-mail at: [email or web address]  Will anyone else be joining your video visit?   Yes: Attempted to invite sister. SARA @ [phone]    Liliana Maldonado LPN  "

## 2020-07-30 NOTE — PROGRESS NOTES
"Yi Cazares is a 74 year old female who is being evaluated via a billable video visit.      The patient has been notified of following:     \"This video visit will be conducted via a call between you and your physician/provider. We have found that certain health care needs can be provided without the need for an in-person physical exam.  This service lets us provide the care you need with a video conversation.  If a prescription is necessary we can send it directly to your pharmacy.  If lab work is needed we can place an order for that and you can then stop by our lab to have the test done at a later time.    Video visits are billed at different rates depending on your insurance coverage.  Please reach out to your insurance provider with any questions.    If during the course of the call the physician/provider feels a video visit is not appropriate, you will not be charged for this service.\"    Patient has given verbal consent for Video visit? Yes  How would you like to obtain your AVS? MyChart  If you are dropped from the video visit, the video invite should be resent to: Send to e-mail at: [email or web address]  Will anyone else be joining your video visit?   Yes: Attempted to invite sister. SARA @ [phone]    Liliana Maldonado LPN        "

## 2020-07-30 NOTE — LETTER
2020      RE: Yi Cazares  [address] [facility] [address]       Yi Cazares is a 74 year old female who is being evaluated via a billable video visit.           GENETICS CLINIC CONSULTATION     Name:  Yi Cazares  :   1945  MRN:   [id number]  Date of service: 2020  Primary Care Provider: Castillo Su  Referring Provider: Castillo Su    Dear Dr. Castillo Su,     We had the pleasure of seeing your patient in Genetics Clinic today via video visit.     Reason for consultation:  A consultation in the [facility] was requested for Yi, a 74 year old female, for evaluation of congenital hand malformation.     Yi was accompanied to this visit by her sister. She also saw our genetic counselor at this visit.       History is obtained from Patient, electronic health record and sister.    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 Her family has been affected for 4 generations and they are concerned that the condition is worsening as the generations progress and are additionally worried that unaffected individuals may be carriers. We discussed the pattern of inheritance in the family is most consistent with AD.    Ectrodactyly or split-hand/foot malformations (SHFM) is a rare congenital malformation of the limbs, involving mostly the central rays of the autopods, median clefts in hands and feet, syndactyly, and metacarpal, metatarsal and phalangeal aplasia or hypoplasia. The hands and/or feet appear split into 2 halves with aplasia (failure of development) of the phalanx, metacarpal, and/or metatarsal bones of one or more fingers and/or toes as well as hypoplasia (underdevelopment) of the phalanges, metacarpals, and metatarsals (the bones leading up to the toes). SHFM is inherited as an autosomal dominant, recessive, or X-linked entity, and its clinical severity varies from patient to patient  as well as between the limbs of the same patient. SHFM is a genetically heterogeneous disorder with variable expressivity inherited as syndromic and nonsyndromic forms.    We discussed the different options between obtaining a NGS panel looking for known genes (DLX5/DLX6/DSS1, HOX11, HOXD13, TP63, LOUISA, WNT10B, BHLHA9) associated with split hand/ foot malformations vs. exome sequencing with Yi as proband and some other affected and unaffected family members that will help with seggregation. Genetic counseling was provided by the  and advantages and disadvantages of each test were discussed in detail. At this time the family would like to pursue exome sequencing.    Yi also inquire about any gene therapy. I discussed there was none at this time, but option of PGD once we have the familial mutation was discussed in detail.     Plan:    1. Ordered at this visit: none   2. Genetic testing: Prior-auth for exome sequencing.   3. Genetic counseling consultation with Kamilah Alonso MS, [facility] to update pedigree, provide genetic counseling regarding ectrodactyly, obtain consent for genetic testing and coordinate testing of family members  4. Follow up:  Return in about 6 months (around 1/30/2021).    References:  https://www.[employer].GeoGRAFI/Article/FullText/521243#:~:text=Ectrodactyly%20or%20split%2Dhand%2Ffoot,and%20phalangeal%20aplasia%20or%20hypoplasia.  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 She has had multiple surgeries on her hands. She was told in 1968 that her condition was autosomal dominant when she went to [facility], but their family has been told more recently that this condition may skip generations so they are seeking further genetic testing to better understand their condition and to see if future generations may be affected even if they don't appear  "symptomatic.     They are concerned that the condition is getting severe through the affected generations. Yi and her mom both have their left hand affected. Yi's son, along with two of her grandchildren have both hands and feet affected. She also has an unaffected grandchild. Yi's sister Nedra is on the call today and has also been seen by a genetic counselor before. She and her two children are unaffected. Nedra is interested to see if her future grandchildren are at risk. They desire testing for many of the affected and nonaffected family members.    Yi denies personal history of delayed development, early onset hearing loss or foot malformation.     Developmental/Educational History:  Yi denies any history of development issues or cognitive delays.   Yi got her doctorate in education.     Pregnancy/ History:  Yi was born via vaginal delivery  Pregnancy complications included \"blue at birth\" but did not need to stay longer a typical baby.     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 - Second toe removed in both of feet.   - right knee replaced  - left knee shattered and woven back together  - cataract surgery    Medications:  Current Outpatient Medications   Medication Sig Dispense Refill     acetaminophen (TYLENOL) 325 MG tablet        albuterol (PROAIR RESPICLICK) 108 (90 Base) MCG/ACT inhaler Inhale 2 puffs into the lungs       alendronate (FOSAMAX) 70 MG " tablet Take 70 mg by mouth       atorvastatin (LIPITOR) 40 MG tablet Take 40 mg by mouth       azelastine (ASTELIN) 0.1 % nasal spray 1-2 sprays       busPIRone (BUSPAR) 15 MG tablet Take 15 mg by mouth       busPIRone (BUSPAR) 7.5 MG tablet Take 7.5 mg by mouth       cholecalciferol 25 MCG (1000 UT) TABS Take 1,000 Units by mouth       clindamycin (CLEOCIN) 300 MG capsule Take 2 capsules by mouth 1-2 hours prior to a dental procedure.       EPINEPHrine (ANY BX GENERIC EQUIV) 0.3 MG/0.3ML injection 2-pack Inject 0.3 mg into the muscle       estradiol (ESTRACE) 0.1 MG/GM vaginal cream Place 1 g vaginally       famotidine (PEPCID) 20 MG tablet Take 20 mg by mouth       fluticasone (FLONASE) 50 MCG/ACT nasal spray 2 sprays       gabapentin (NEURONTIN) 100 MG capsule TAKE TWO CAPSULES BY MOUTH TWICE A DAY       hydrochlorothiazide (HYDRODIURIL) 25 MG tablet Take 25 mg by mouth       ipratropium (ATROVENT) 0.03 % nasal spray 2 sprays       losartan (COZAAR) 100 MG tablet Take 100 mg by mouth       mirabegron (MYRBETRIQ) 25 MG 24 hr tablet Take 25 mg by mouth       montelukast (SINGULAIR) 10 MG tablet Take 10 mg by mouth       omeprazole (PRILOSEC) 20 MG DR capsule TAKE ONE CAPSULE BY MOUTH TWICE A DAY, 1 HOUR BEFORE A MEAL       oxybutynin ER (DITROPAN-XL) 10 MG 24 hr tablet Take 10 mg by mouth       ranitidine (ZANTAC) 150 MG tablet Take 150 mg by mouth       spironolactone-HCTZ (ALDACTAZIDE) 25-25 MG tablet Take 1 tablet by mouth       tiZANidine (ZANAFLEX) 2 MG capsule Take 2 mg by mouth       tiZANidine (ZANAFLEX) 2 MG tablet Take 2 mg by mouth         Allergies:  Allergies   Allergen Reactions     Bee Venom Anaphylaxis     Moxifloxacin Hives     Oxycodone Anaphylaxis     Shellfish Allergy Anaphylaxis and Hives     Amlodipine Other (See Comments)     Leg swelling      Cyclosporine Unknown and Other (See Comments)     Painful redness on lids        Doxazosin Other (See Comments)     intolerance  Ankle swelling        "Hydrocodone-Acetaminophen Other (See Comments)     Mushroom Extract Complex GI Disturbance     Neosporin Hives     \"Many antibiotics\"     No Clinical Screening - See Comments Hives     Narcotic and synthetic narcotics.     Tramadol Nausea and Vomiting     Wasp Venom Protein Hives     Venom of stinging insects     Codeine Rash and Nausea and Vomiting     Morphine Rash, Hives, Unknown and Nausea     vicodin  Redness in face  REDNESS IN FACE       Propoxyphene Swelling and Rash     Redness  DARVON       Rofecoxib Other (See Comments) and Rash     STOMACH UPSET       Sulfa Drugs Rash       Diet:  Regular     Care team:  Patient Care Team:  Castillo Su PCP - General (Internal Medicine)    Review of Systems:  GENERAL:  Endorses occasional headache.  EYES:  Endorses hx of cataract surgery. Uses glasses.   EARS: high frequency hearing loss  NOSE/MOUTH/THROAT: Denies: Epistaxis, Difficulty swallowing, facial weakness  RESPIRATORY: Denies: Cough, shortness of breath  CARDIOVASCULAR: Denies: chest pain  GASTROINTESTINAL: nausea with fatty foods. Denies diarrhea, vomiting, constipation   MUSCULOSKELETAL:  Denies: fractures, see HPI and surgical history  RENAL/URINARY: frequent UTIs in past, but treated with medication and resolved. Denies: Hx of kidney stones,  Hematuria,    NEUROLOGICAL: Denies: Hx of seizures or tremor  ENDOCRINE: Denies: Thyroid problems  INTEGUMENT: Denies: Birthmarks, Rashes, pigmentation issues, Poor wound healing    Family History:    A detailed pedigree was obtained by the genetic counselor at the time of Nedra's appointment and is scanned into the electronic medical record. I personally reviewed and discussed the pedigree with the GC and the family and concur with the GC note. Please refer to the formal pedigree for full details.     Yi and her mom both have their left hand affected. Yi's son, along with two of her grandchildren have both hands and feet affected . Yi's sister Nedra is " on the call today and has also been seen by a genetic counselor before. She and her two children are unaffected.      Son- 39y, ectrodactyly of right and left hands and clefting/webbing of right and left feet. He had a normal kidney evaluation.    Granddaughter/ grandson- grand daughter, 6y with ectrodactyly of right and left hands and clefting/webbing of right and left feet; normal kidney evaluation. Another grand daughter, 20 months, atopic dermatitis and no limb malformations. Grandson, 5.5 months, with ectrodactyly of right and left hands (no thumbs), missing radius, abnormal wrist joint preventing him from extension of hand, and clefting/webbing of right and left feet.    Mother-  at 85y from sepsis from a cold. H/o ectrodactyly of left hand and webbing of 3-4 fingers on right hand. H/o hearing loss when she was older requiring hearing aids, heart murmur, and venous problems of her legs.       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 Now 's grandchildren.    Physical Examination:  There were no vitals taken for this visit.  Weight %tile:Normalized weight-for-age data not available for patients older than 20 years.  Height %tile: Normalized stature-for-age data not available for patients older than 20 years.  Head Circumference %tile: No head circumference on file for this encounter.  BMI %tile: No height and weight on file for this encounter.    Pictures taken during the visit: no pictures sent ahead of time via e-mail.     GENERAL: Healthy, alert and no distress  EYES: Eyes grossly normal to inspection.  No discharge or erythema, or obvious scleral/conjunctival abnormalities. Uses glasses.   NOSE: normal, no discharge  RESP: No audible wheeze, cough, or visible cyanosis.  No visible retractions or increased work of breathing.    SKIN: Visible skin clear. No significant rash, abnormal pigmentation or lesions.  NEURO: Cranial nerves grossly intact.  Mentation and speech  appropriate for age.  Extremities/Musculoskeletal:      Right hand: Cutaneous webbing between third and fourth fingers of right hand.     Left hand: normal fifth digit. Third and fourth finger fused, prominent knuckle with time, Index finger extends only to first knuckle. Thumb is smaller than    right hand thumb, but functions fine. Web between thumb and first finger was cut at 18 years old to improve function.   PSYCH: Mentation appears normal, affect normal/bright, judgement and insight intact, normal speech and appearance well-groomed.    Genetic testing done to date:  none    Pertinent lab results:   N/A    Imaging results:  none    Thank you for allowing us to participate in the care of Yi Cazares. Please do not hesitate to contact us with questions.    Monet Hopper, MS4      Physician Attestation   I, Lidia Shipley, was present with the medical student who participated in the service and in the documentation of the note.  I have edited the note, verified the history and personally performed the physical exam and medical decision making.  I agree with the assessment and plan of care as documented in the note.      Items personally reviewed: growth parameters, labs and imaging and agree with the interpretation documented in the note.    I spent a total of 60 minutes face-to-face with Yi Cazares and her sister during today's video visit.     Lidia Shipley MD    Division of Genetics and Metabolism  Department of Pediatrics      Route to  Patient Care Team:  Castillo Su as PCP - General (Internal Medicine)      Video-Visit Details     Type of service:  Video Visit     Video Start Time: 2:35 PM    Video End Time (time video stopped): 3:35 PM    Originating Location (pt. Location): Home     Distant Location (provider location):  PEDS GENETICS      Mode of Communication:  Video Conference via [employer]    Lidia Shipley MD

## 2020-07-30 NOTE — NURSING NOTE
Chief Complaint   Patient presents with     Consult     'Encouraged by sister to have appointment regarding genetic testing'     There were no vitals filed for this visit.  Liliana Maldonado LPN  July 30, 2020

## 2020-08-01 NOTE — PATIENT INSTRUCTIONS
Genetics  [facility]     Contact our nurse coordinator Ariane Sifuentes MSN, RN at [phone] or send a [employer] message for any non-urgent general or medical questions.     If you had genetic testing and have further questions, please contact the genetic counselor:    Kamilah Alonso  Ph: [phone]    To schedule appointments:  Pediatric Call Center for [facility]: [phone]  Neuropsychology [phone]  Radiology/ Imaging/Echocardiogram: [phone]   Services:   [phone]       [employer] enables easy and confidential communication with your care team.

## 2020-08-14 ENCOUNTER — MEDICAL CORRESPONDENCE (OUTPATIENT)
Dept: HEALTH INFORMATION MANAGEMENT | Facility: CLINIC | Age: 75
End: 2020-08-14

## 2020-08-14 PROBLEM — Q73.0: Status: ACTIVE | Noted: 2020-08-14

## 2020-08-14 PROBLEM — H90.5 SNHL (SENSORINEURAL HEARING LOSS): Status: ACTIVE | Noted: 2020-08-14

## 2020-08-24 DIAGNOSIS — Z84.89 FAMILY HISTORY OF GENETIC DISEASE: ICD-10-CM

## 2020-08-24 DIAGNOSIS — Q71.62: ICD-10-CM

## 2020-08-24 DIAGNOSIS — H90.3 SENSORINEURAL HEARING LOSS (SNHL) OF BOTH EARS: ICD-10-CM

## 2020-12-02 LAB
LAB SCANNED RESULT: NORMAL
MISCELLANEOUS TEST: NORMAL

## 2020-12-07 ENCOUNTER — TELEPHONE (OUTPATIENT)
Dept: CONSULT | Facility: CLINIC | Age: 75
End: 2020-12-07

## 2020-12-07 DIAGNOSIS — H90.5 SNHL (SENSORINEURAL HEARING LOSS): Primary | ICD-10-CM

## 2020-12-07 DIAGNOSIS — Q73.0 ECTRODACTYLY: ICD-10-CM

## 2020-12-07 NOTE — LETTER
December 7, 2020      TO: Yi Cazares  [address] [facility] [address]         Dear Yi,  Thank you for allowing us to be a part of Yi's healthcare over the last several months at the [facility].  At your most recent visit a genetic test called exome sequencing was pursued.  As we have discussed by telephone this returned negative (normal).  This letter will serve as a brief summary of our visit and these results.  I have also included a copy of the lab report for your records.       Genetics  As we have reviewed during our appointments, genes are long stretches of DNA that are responsible for how our bodies look and how our bodies work.  Our genes are inherited on structures called chromosomes of which we have 23 pairs.  The first 22 pairs of chromosomes are the same in males and females while the 23rd pair of chromosomes, the sex chromosomes, are different in males and females.  Males have one copy of the X-chromosome and one copy of the Y-chromosome while females have two copies of the X-chromosome.  We all have variations in our chromosomes and genes that make us unique but some variations, also called pathogenic variants, can result in a genetic condition.        Genetic Testing  A genetic test called exome sequencing was ordered.  Exome sequencing is a test that uses advanced technology to read through the majority of an individual's expressed genes.  This is the most comprehensive genetic testing currently available clinically.  This was pursued at our recent visit and sent to a lab called [facility].      Exome sequencing included analysis of most of Yi's nuclear genes. These are genes found in the nucleus of the cell; typically one copy of each of these genes is inherited from the biological mother and one copy of each is inherited from the biological father.     Results of exome sequencing can be positive (providing a genetic diagnosis), negative (normal), or uncertain (a  genetic alteration was found, but we cannot be certain that it causes disease).      Genetic Test Results  As we have discussed by phone this test returned negative, meaning it did not find any clear answer for the ectrodactyly in the family.  Although this is the most comprehensive test clinically available, it still has significant limitations as we continue to learn more about genetics.  Additionally, this technology misses certain types of disease-causing mutations.  This normal result therefore does not rule out a genetic condition, and instead it could be that our knowledge and technology are not yet good enough to determine the cause of the ectrodactyly.      Finally, exome sequencing has the potential to find pathogenic variants in genes unrelated to a patient's current symptoms but that would have implications for their health later on. Examples include genes that increase the chances of developing heart disease or cancer. For this reason the American College of Medical Genetics has created a list of genes they have determined to be clinically relevant and medically actionable, meaning steps can be taken to prevent or reduce the risk of serious disease. Analysis of secondary findings as recommended by ACMG was offered and declined..     Follow-Up  Because our knowledge is always growing the lab offers re-analysis of the exome one time in the future free of charge.  This involves running the data from the genetic test through the computer algorithms again, which takes advantage of all the new information we learn in the interim. We would recommend waiting 1 in 2 years, if not more, to do this.    There are copy number variants (deletions and duplications of genetic information) that can be associated with ectrodactyly (10q24 deletion and 17p13.3 duplication). These copy number variants may or may not be present in the family. Copy number variants would fit the pattern of inheritance in the family. We have  reviewed the exome results with the lab. They do not see any indication of deletions or duplications in the exome raw data, however, this is insufficient to rule them out completely. A microarray is indicated.     Please look forward to the genetic test cheek swab kit that is in the mail to your home for the chromosomal microarray. Please let Kamilah Alonso know if it does not arrive. Once the lab has received the sample. I will call you with results in 4-6 weeks.      Sincerely,     Kamilah Alosno [facility]  Genetic Counselor   [facility]   Phone: [phone]

## 2020-12-10 ENCOUNTER — TELEPHONE (OUTPATIENT)
Dept: CONSULT | Facility: CLINIC | Age: 75
End: 2020-12-10

## 2020-12-10 NOTE — TELEPHONE ENCOUNTER
Received VM from iY regarding report from [facility]. Report did not mention other familial samples who were included for analysis. Reviewed that this analysis was not completed as a genetic change was not seen in Yi. This analysis is not needed at this time. Yi asked if that would change billing. She will reach out to [facility] regarding this question. I am not aware of it reducing cost.    Reviewed pattern of inheritance in the family is most consistent with autosomal dominant inheritance. The most likely scenario is that unaffected individuals do not have the familial genetic change for ectrodactyly. That said, until we identify the genetic cause for ectrodactyly in the family, we cannot rule out unaffected individuals having the genetic change/ potentially having an affected child.     Kit for microarray sent out to Yi's home. She will call me if it is not received.    Kamilah Alonso [facility]  Genetic Counselor   [facility]   Phone: [phone]

## 2020-12-17 ENCOUNTER — TELEPHONE (OUTPATIENT)
Dept: CONSULT | Facility: CLINIC | Age: 75
End: 2020-12-17

## 2020-12-17 NOTE — TELEPHONE ENCOUNTER
Received VM from Yi regarding [facility] buccal kit paperwork. All questions answered. She will be sending it back shortly.    Kamilah Alonso [facility]  Genetic Counselor   [facility]   Phone: [phone]

## 2020-12-18 ENCOUNTER — APPOINTMENT (OUTPATIENT)
Dept: LAB | Facility: CLINIC | Age: 75
End: 2020-12-18
Attending: MEDICAL GENETICS
Payer: MEDICARE

## 2021-01-15 ENCOUNTER — HEALTH MAINTENANCE LETTER (OUTPATIENT)
Age: 76
End: 2021-01-15

## 2021-01-20 DIAGNOSIS — Q73.0 ECTRODACTYLY: ICD-10-CM

## 2021-01-20 DIAGNOSIS — H90.5 SNHL (SENSORINEURAL HEARING LOSS): ICD-10-CM

## 2021-01-20 LAB
LAB SCANNED RESULT: NORMAL
MISCELLANEOUS TEST: NORMAL

## 2021-01-29 ENCOUNTER — TELEPHONE (OUTPATIENT)
Dept: CONSULT | Facility: CLINIC | Age: 76
End: 2021-01-29

## 2021-01-29 NOTE — TELEPHONE ENCOUNTER
Called Yi to discuss the results of Yi's genetic testing for ectrodactyly. A chromosomal MicroArray (CMA), also called CGH with SNP, was completed, completed at [facility]. These results were uncertain. 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    arr[GRCh37] 16q23.1q23.2([id number])x1    This is a copy number change of uncertain clinical significance. The deleted interval contains five genes (CZYKPG66, NUDT7, VAT1L, CLEC3A,  WWOX), two of which are associated with known clinical disorders at present. Biallelic (two) variants in the IEYWRJ08 gene (OMIM 865810) are associated with microcornea, myopic chorioretinal atrophy, and telecanthus. Biallelic variants in the  WWOX gene (OMIM 212293) are associated with developmental and epileptic encephalopathy 28 and autosomal recessive spinocerebellar ataxia 12. A de jennifer variant within this region has been reported in the [employer]IPHER Clinical Database in a  patient with feeding difficulties, microcephaly, dysmorphic features, and neoplasm (case 797373)(Sherman et al., 2009). This region in its entirety is not known to vary in copy number in the normal population, suggesting that this aberration may have clinical relevance. Follow-up testing for relatives is recommended. Two relatives may be tested free of charge, and others thereafter would be charged.    Some copy number variants have been associated with ectrodactyly, but those copy number variants do not overlap with the one found in Yi. The genes in Yi's deleted region are not associated with ectrodactyly or similar disorders to date.    This does indicate she is a carrier for the autosomal recessive genes in this region. Other family members may also carry the same variant. This information is relevant for their reproductive risks/decisions. Family members may carry this deletion even if they do not have ectrodactyly. Genetic counseling for individuals who would like to discuss their  reproductive risks and available genetic testing.    PLAN  1. Yi will call me after she has discussed genetic testing with Fawn. We will set up a conference call between the three of us afterwards.  2. I will mail results to home alongside interpretation letter. MyChart letter sent.  3. No additional questions or concerns. Contact information provided    Kamilah Alonso [facility]  Genetic Counselor   [facility]   Phone: [phone]  Pager: [phone]  Email: [email or web address]

## 2021-02-05 ENCOUNTER — TELEPHONE (OUTPATIENT)
Dept: CONSULT | Facility: CLINIC | Age: 76
End: 2021-02-05

## 2021-02-05 NOTE — TELEPHONE ENCOUNTER
Received call from Yi's sister, Fawn, that her, Yi, and potentially other relatives would like a conference call regarding CGH results.    Appointment requested before 11:30am or after 1pm. Fawn can be called at [phone]    Questions they would like addressed at visit:    What is our impression of the results?    What are next steps? Who else should be tested?    What does this result mean for recurrence risk for unaffected individuals? How does this change     Sent message to     Kamilah Alonso [facility]  Genetic Counselor   [facility]   Phone: [phone]

## 2021-03-12 ENCOUNTER — VIRTUAL VISIT (OUTPATIENT)
Dept: CONSULT | Facility: CLINIC | Age: 76
End: 2021-03-12
Attending: GENETIC COUNSELOR, MS
Payer: MEDICARE

## 2021-03-12 DIAGNOSIS — Q71.62: ICD-10-CM

## 2021-03-12 DIAGNOSIS — Q73.0 ECTRODACTYLY: Primary | ICD-10-CM

## 2021-03-12 DIAGNOSIS — H90.5 SNHL (SENSORINEURAL HEARING LOSS): ICD-10-CM

## 2021-03-12 PROCEDURE — 999N000069 HC STATISTIC GENETIC COUNSELING, < 16 MIN: Mod: GT | Performed by: GENETIC COUNSELOR, MS

## 2021-03-12 PROCEDURE — 96040 HC GENETIC COUNSELING, EACH 30 MINUTES: CPT | Mod: PN | Performed by: GENETIC COUNSELOR, MS

## 2021-03-12 NOTE — LETTER
3/12/2021      RE: Yi Cazares  [address] [facility] [address]       Name:  Yi Cazares  :   1945  MRN:   [id number]  Date of service: Mar 12, 2021  Primary Provider: Castillo Su  Referring Provider: No ref. 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    Yi was accompanied to this visit by her sister.     History is obtained from Patient and electronic health record. I met with the family for follow-up to review genetic test results and next steps for testing.       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 A genetic etiology is highly suspected.     Exome sequencing was pursued at [facility], which was negative. A chromosomal MicroArray (CMA), also called CGH with SNP, was completed at [facility]. These results were uncertain. 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 The deleted interval contains five genes (DDJLZA90, NUDT7, VAT1L, CLEC3A, WWOX), two of which are associated with known clinical disorders at present. Biallelic (two) variants in the FHFMCF51 gene (OMIM 453014) are associated with microcornea, myopic chorioretinal atrophy, and telecanthus. Biallelic variants in the WWOX gene (OMIM 737030) are associated with developmental and epileptic encephalopathy 28 and autosomal recessive spinocerebellar ataxia 12. A de jennifer variant within this region has been reported in the DECIPHER Clinical Database in a patient with feeding difficulties, microcephaly, dysmorphic features, and neoplasm (case 159913)(Sherman et al., 2009). This region in its entirety is not known to vary in copy number in the normal population, suggesting that this  aberration may have clinical relevance. Follow-up testing for relatives is recommended. Two relatives may be tested free of charge, and others thereafter would be charged (~$250). We reviewed that this is unlikely to reclassify the variant alone, but would be beneficial information for future investigations into this variant. Relatives would receive a report with the findings.    Some copy number variants have been associated with ectrodactyly, but those copy number variants do not overlap with the one found in Yi. The genes in Yi's deleted region are not associated with ectrodactyly or similar disorders to date. It could be that there is a novel association between one of the five above genes and ectrodactyly. It could also be that there is an unknown gene in this region causing ectrodactyly. Lastly, it is possible this deletion is not the cause of the family's ectrodactyly, and is an incidental finding.      Based on the information we have at this time, the most likely scenario is that the ectrodactyly is an autosomal dominant condition in the family, meaning those without ectrodactyly are not at an increased risk of having an affected child (beyond the general population risk). There is a small chance that those without ectrodactyly may have an affected child as we do not know the genetic cause in the family yet. This cannot be ruled out until we identify the genetic variant in the family. In those with ectrodactyly, our best estimate is that there is a 1 in 2 chance of having an affected child. Preimplantation genetic testing is not available as we do not know the genetic cause in the family yet.        This chromosome 16 deletion does indicate Yi is a carrier for the autosomal recessive genes in this region. Other family members may also carry the same variant. This information is relevant for their reproductive risks/decisions. Family members may carry this deletion even if they do not have  ectrodactyly. Genetic counseling for individuals who would like to discuss their reproductive risks and available genetic testing. We reviewed that carrier testing is available to relatives regardless of this result, as many individuals are carriers for genetic conditions, and this may be pertinent to their reproductive decision-making.      Nedra consented to targeted testing for the deletion today. We reviewed potential results. Testing is at no charge. Yi's affected son, Yuriy, would also be beneficial to test, regardless of Nedra's results. The family would like to await Nedra's results to better understand our level of suspicion regarding the variant's pathogenicity for ectrodactyly. Nedra provided informed consent for the testing, signed with verbal consent (COVID-19).      Lastly, we reviewed exome re-analysis, which is done free of charge in ~2-5 years. This can identify new gene associations that are discovered between now and the time of re-analysis. It can lead to a diagnosis. After ~5 years, technology may have improved significantly and re-sequencing would likely be recommended.    1. Previous genetic test results were reviewed    2. Testing for two relatives (one affected, one unaffected) offered free of charge. Others can be tested at a charge.    3. Consent obtained for Nedra. We will use DNA at [facility] already    4. After testing is initiated, results will be returned by phone in ~3 weeks    5. Exome re-analysis can be considered in ~2-5 years    6. Contact information was provided should any questions arise in the future.       HPI:  Patient Active Problem List   Diagnosis     Ectrodactyly     SNHL (sensorineural hearing loss)       Pertinent studies/abnormal test results:   Microarray (CGH with SNP): arr[GRCh37] 16q23.1q23.2([id number])x1   Exome sequencing (561a): negative    Imaging results:   No results found for this or any previous visit (from the past 744 hour(s)).  No  results found for any visits on 03/12/21.    Past Medical History:  No past medical history on file.       FAMILY HISTORY  A three generation pedigree was previously obtained and scanned into the EMR. See scanned pedigree in Media tab.       Kamilah Alonso [facility]  Genetic Counselor   [facility]   Phone: [phone]  Pager: [phone]  Email: [email or web address]    Approximate Time Spent in Consultation: 45 min     CC: Patient    This note was written with the assistance of voice recognition software and may contain occasional typographic errors. Please contact our office if you identify errors requiring correction.      Kamilah Alonso, GC

## 2021-03-12 NOTE — PROGRESS NOTES
Name:  Yi Cazares  :   1945  MRN:   [id number]  Date of service: Mar 12, 2021  Primary Provider: Castillo Su  Referring Provider: No ref. 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    Yi was accompanied to this visit by her sister.     History is obtained from Patient and electronic health record. I met with the family for follow-up to review genetic test results and next steps for testing.       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 A genetic etiology is highly suspected.     Exome sequencing was pursued at [facility], which was negative. A chromosomal MicroArray (CMA), also called CGH with SNP, was completed at [facility]. These results were uncertain. 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 The deleted interval contains five genes (YBRBWL29, NUDT7, VAT1L, CLEC3A, WWOX), two of which are associated with known clinical disorders at present. Biallelic (two) variants in the FTDOSK64 gene (OMIM 181728) are associated with microcornea, myopic chorioretinal atrophy, and telecanthus. Biallelic variants in the WWOX gene (OMIM 974507) are associated with developmental and epileptic encephalopathy 28 and autosomal recessive spinocerebellar ataxia 12. A de jennifer variant within this region has been reported in the DECIPHER Clinical Database in a patient with feeding difficulties, microcephaly, dysmorphic features, and neoplasm (case 201943)(Sherman et al., 2009). This region in its entirety is not known to vary in copy number in the normal population, suggesting that this aberration may have clinical relevance. Follow-up testing for relatives is recommended.  Two relatives may be tested free of charge, and others thereafter would be charged (~$250). We reviewed that this is unlikely to reclassify the variant alone, but would be beneficial information for future investigations into this variant. Relatives would receive a report with the findings.    Some copy number variants have been associated with ectrodactyly, but those copy number variants do not overlap with the one found in Yi. The genes in Yi's deleted region are not associated with ectrodactyly or similar disorders to date. It could be that there is a novel association between one of the five above genes and ectrodactyly. It could also be that there is an unknown gene in this region causing ectrodactyly. Lastly, it is possible this deletion is not the cause of the family's ectrodactyly, and is an incidental finding.      Based on the information we have at this time, the most likely scenario is that the ectrodactyly is an autosomal dominant condition in the family, meaning those without ectrodactyly are not at an increased risk of having an affected child (beyond the general population risk). There is a small chance that those without ectrodactyly may have an affected child as we do not know the genetic cause in the family yet. This cannot be ruled out until we identify the genetic variant in the family. In those with ectrodactyly, our best estimate is that there is a 1 in 2 chance of having an affected child. Preimplantation genetic testing is not available as we do not know the genetic cause in the family yet.        This chromosome 16 deletion does indicate Yi is a carrier for the autosomal recessive genes in this region. Other family members may also carry the same variant. This information is relevant for their reproductive risks/decisions. Family members may carry this deletion even if they do not have ectrodactyly. Genetic counseling for individuals who would like to discuss their reproductive risks  and available genetic testing. We reviewed that carrier testing is available to relatives regardless of this result, as many individuals are carriers for genetic conditions, and this may be pertinent to their reproductive decision-making.      Nedra consented to targeted testing for the deletion today. We reviewed potential results. Testing is at no charge. Yi's affected son, Yuriy, would also be beneficial to test, regardless of Nedra's results. The family would like to await Nedra's results to better understand our level of suspicion regarding the variant's pathogenicity for ectrodactyly. Nedra provided informed consent for the testing, signed with verbal consent (COVID-19).      Lastly, we reviewed exome re-analysis, which is done free of charge in ~2-5 years. This can identify new gene associations that are discovered between now and the time of re-analysis. It can lead to a diagnosis. After ~5 years, technology may have improved significantly and re-sequencing would likely be recommended.    1. Previous genetic test results were reviewed    2. Testing for two relatives (one affected, one unaffected) offered free of charge. Others can be tested at a charge.    3. Consent obtained for Nedra. We will use DNA at [facility] already    4. After testing is initiated, results will be returned by phone in ~3 weeks    5. Exome re-analysis can be considered in ~2-5 years    6. Contact information was provided should any questions arise in the future.       HPI:  Patient Active Problem List   Diagnosis     Ectrodactyly     SNHL (sensorineural hearing loss)       Pertinent studies/abnormal test results:   Microarray (CGH with SNP): arr[GRCh37] 16q23.1q23.2([id number])x1   Exome sequencing (561a): negative    Imaging results:   No results found for this or any previous visit (from the past 744 hour(s)).  No results found for any visits on 03/12/21.    Past Medical History:  No past medical history on  file.       FAMILY HISTORY  A three generation pedigree was previously obtained and scanned into the EMR. See scanned pedigree in Media tab.       Kamilah Alonso [facility]  Genetic Counselor   [facility]   Phone: [phone]  Pager: [phone]  Email: [email or web address]          Approximate Time Spent in Consultation: 45 min     CC: Patient        This note was written with the assistance of voice recognition software and may contain occasional typographic errors. Please contact our office if you identify errors requiring correction.

## 2021-04-08 ENCOUNTER — TELEPHONE (OUTPATIENT)
Dept: CONSULT | Facility: CLINIC | Age: 76
End: 2021-04-08

## 2021-04-08 NOTE — CONFIDENTIAL NOTE
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 Nedra gave permission for me to share results with Yi.     Results  Targeted chromosomal MicroArray (CMA), also called CGH with SNP, was completed, completed at [facility]. These results were negative.     Interpretation  Nedra does not carry the 16q23.1q23.2 deletion that Yi has. This increases the chance that it is related to the ectrodactyly in the family. There may yet be an incidental finding unrelated to the ectrodactyly.      To continue to investigate this deletion, we can consider testing for Yi's son and/or grandchildren. They can see me for genetic counseling if they live in [place]. They must first register as a patient with [facility] by calling [phone]. Once registered, they can schedule a phone or video appointment with me by calling Lauren Cruz, genetics , at [phone]. If they do not live in [place], they may seek a genetic counseling referral through their PCP or travel to be in [place] at the time of the call with me.      Because Nedra does not have this deletion, she could not have passed it on to her children. Testing for them is not indicated. The carrier risks for this deletion (previously discussed) no longer apply to Nedra or her descendants.      Yi expressed excellent understanding of above information and will share my contact information with her son.    Plan  1. I will mail results to Yi's home alongside interpretation with her sister's permission  2. I will await contact from Yi's son, Yuriy. Yi gave her permission for me to share her PHI with Yuriy and his grandchildren's caregivers/providers.  3. No additional questions or concerns. Contact information provided     Kamilah Alonso [facility]  Genetic Counselor   [facility]   Phone: [phone]  Pager:  [phone]  Email: [email or web address]

## 2021-04-08 NOTE — CONFIDENTIAL NOTE
Called Yi to discuss genetic test results from her sister. She requested I call her back again this afternoon.

## 2021-06-10 ENCOUNTER — TELEPHONE (OUTPATIENT)
Dept: CONSULT | Facility: CLINIC | Age: 76
End: 2021-06-10

## 2021-06-10 NOTE — CONFIDENTIAL NOTE
Called Yi to review plan for genetic testing. Yi's family members are not interested in participating in genetic testing due to the potential for their genetic result to impact their life/disability insurance. Yi and her family would still like to find an answer to their familial ectrodactyly and encourage contact about other tests/research opportunities.    She will follow-up in 2+ years to consider reanalysis. I will message  to add to recall list.    Kamilah Alonso [facility]  Genetic Counselor   [facility]   Phone: [phone]

## 2021-10-24 ENCOUNTER — HEALTH MAINTENANCE LETTER (OUTPATIENT)
Age: 76
End: 2021-10-24

## 2022-02-13 ENCOUNTER — HEALTH MAINTENANCE LETTER (OUTPATIENT)
Age: 77
End: 2022-02-13

## 2022-10-15 ENCOUNTER — HEALTH MAINTENANCE LETTER (OUTPATIENT)
Age: 77
End: 2022-10-15

## 2023-03-26 ENCOUNTER — HEALTH MAINTENANCE LETTER (OUTPATIENT)
Age: 78
End: 2023-03-26

## 2023-10-20 ENCOUNTER — MYC MEDICAL ADVICE (OUTPATIENT)
Dept: CONSULT | Facility: CLINIC | Age: 78
End: 2023-10-20
Payer: COMMERCIAL

## 2023-10-23 NOTE — TELEPHONE ENCOUNTER
[facility] Call Center    Phone Message    May a detailed message be left on voicemail: yes     Reason for Call: Other: Yi calling back to [facility]. Caller states phone number she was given to call back was a disconnected number (?). Caller also states that communicating now is best via phone, as she will be traveling over the next couple weeks. Thanks!     Action Taken: Message routed to:  Other: Scheduling Peds Gen    Travel Screening: Not Applicable

## 2023-10-24 NOTE — TELEPHONE ENCOUNTER
Received voicemail from Yi to schedule. Forwarded to .    Kamilah Alonso [facility]  Genetic Counselor   [facility]   Phone: [phone]

## 2023-11-27 ENCOUNTER — MEDICAL CORRESPONDENCE (OUTPATIENT)
Dept: HEALTH INFORMATION MANAGEMENT | Facility: CLINIC | Age: 78
End: 2023-11-27

## 2023-11-27 ENCOUNTER — VIRTUAL VISIT (OUTPATIENT)
Dept: CONSULT | Facility: CLINIC | Age: 78
End: 2023-11-27
Attending: GENETIC COUNSELOR, MS
Payer: COMMERCIAL

## 2023-11-27 VITALS — BODY MASS INDEX: 29.77 KG/M2 | WEIGHT: 168 LBS | HEIGHT: 63 IN

## 2023-11-27 DIAGNOSIS — Q73.0 ECTRODACTYLY: Primary | ICD-10-CM

## 2023-11-27 PROCEDURE — 96040 HC GENETIC COUNSELING, EACH 30 MINUTES: CPT | Mod: GT,95 | Performed by: GENETIC COUNSELOR, MS

## 2023-11-27 ASSESSMENT — PAIN SCALES - GENERAL: PAINLEVEL: NO PAIN (0)

## 2023-11-27 NOTE — LETTER
2023      RE: Yi Cazares  [address]  [place] [address]     Dear Colleague,    Thank you for the opportunity to participate in the care of your patient, Yi Cazares, at the [facility] at [facility]. Please see a copy of my visit note below.    Name:  Yi Cazares  :   1945  MRN:   [id number]  Date of service: 2023  Primary Provider: Castillo Su  Referring Provider: Referred Self    PRESENTING INFORMATION   Reason for consultation:  Yi is a 78 year old female, who returns to genetics clinic at [facility] for The encounter diagnosis was Ectrodactyly.    Yi was unaccompanied to this visit.    History is obtained from Patient and electronic health record. I met with Yi to update history and offer exome reanalysis.    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  Exome sequencing was pursued at [facility], which was negative. 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 This was absent in her unaffected sister, Nedra.  This deletion remains uncertain.  We can send exome reanalysis to see if there have been any new gene discoveries that might provide a genetic diagnosis for Yi.  This Yi provided informed consent for the testing, signed with verbal consent (COVID-19).      Previous genetic test results were reviewed  Previous exome data will be collected and sent to [facility] for   No orders of the defined types were placed in this encounter.    After testing is initiated, results will be returned by phone in ~4 months and we will schedule a follow-up appointment according to Dr. Shipley.  Contact information was provided should any questions arise in the future.       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 She has had multiple surgeries on her hands. She was told in  that her condition was autosomal dominant when she went to [facility], but their family has been told more recently that this condition may skip generations so they sought further genetic testing to better understand their condition and to see if future generations may be affected even if they don't appear symptomatic. They are concerned that the condition is getting severe through the affected generations. Yi and her mom both have their left hand affected. Yi's son, along with two of her grandchildren have both hands and feet affected. She also has an unaffected grandchild. Genetic testing was performed (CMA and exome), which were non-diagnostic.    Yi denies personal history of delayed development, early onset hearing loss or foot malformation.   Yi denies any history of development issues or cognitive delays.   Yi got her doctorate in education.      Additional history is significant for:  Fibromyalgia, Muscle pain and Cervical stenosis of spinal canal   She has had spinal fusions. Patient has had multiple foot surgeries. She has 2nd toe amputations. She has a significant hallux valgus on the right   Recurrent episodes of otitis media with effusion on the right with successful closure of her air bone gap with well-healed tympanic membrane    Allergic rhinitis   Arthritis   Cataract  GERD  Keratoconjunctivitis sicca not specified as Sjogren's   Mitral valve prolapse  Osteoporosis, no longer needing fosamax  Hyperlipidemia  Hypertension  Raynaud phenomenon   Reflex sympathetic dystrophy left foot   Rosacea   HELENA  TMJ arthritis     Pregnancy/ History:  Yi was born via vaginal delivery  Pregnancy complications included \"blue at birth\" but did not need to stay longer a typical baby.        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  No results found for any visits on 11/27/23.    Past Medical History:  No past medical history on file.       FAMILY HISTORY  A three generation pedigree was previously obtained and scanned into the EMR. See scanned pedigree in Media tab. Updates today: Granddaughter, Ashley, has had absence seizures with negative EEG/MRI.    DISCUSSION  We discussed that we can submit for a reanalysis of the exome sequencing data at [facility]. Reanalysis of the data obtained from the original testing is improved by the updated algorithms and variant databases since it was first analyzed. It can identify variants in newly-discovered genes. Yi is looking forward to reanalysis in the hopes of obtaining a diagnosis. We reviewed that the reason we are sending testing is to identify a genetic cause of the familial ectrodactyly, but it may identity a genetic cause for a non-ectordactyly feature.  Yi expressed understanding and provided informed consent for testing.  Potential results were reviewed.  Testing is free of charge (first time reanalysis).    Lab results may be automatically released via [facility].  Department protocol is to hold genetic testing results until we have reviewed them. We will then contact the family directly to disclose the results and ensure they receive a copy of the report. This protocol was reviewed with the family, who were in agreement to hold the results for genetics review and direct contact.          Kamilah Alonso [facility]  Genetic Counselor   [facility]   Phone: [phone]  Pager: [phone]          Approximate Time Spent in Consultation: 30 min          This note was  written with the assistance of voice recognition software and may contain occasional typographic errors. Please contact our office if you identify errors requiring correction.      Please do not hesitate to contact me if you have any questions/concerns.     Sincerely,       Kamilah Alonso GC

## 2023-11-27 NOTE — NURSING NOTE
Is the patient currently in the state of [place]? YES    Visit mode:VIDEO    If the visit is dropped, the patient can be reconnected by: VIDEO VISIT: Text to cell phone:   Telephone Information:   Mobile [phone]       Will anyone else be joining the visit? NO  (If patient encounters technical issues they should call [phone])    How would you like to obtain your AVS? MyChart    Are changes needed to the allergy or medication list? Pt stated no changes to allergies and Pt stated no med changes  Please remove any meds marked not taking and any flagged for removal.    Reason for visit: RECHECK    Wt/ht other than 24 hrs:    Pain more than one location:    Tonia LOPEZF

## 2023-11-27 NOTE — PROGRESS NOTES
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    Yi was unaccompanied to this visit.    History is obtained from Patient and electronic health record. I met with Yi to update history and offer exome reanalysis.    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  Exome sequencing was pursued at [email or web address], which was negative. 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 This was absent in her unaffected sister, Nedra.  This deletion remains uncertain.  We can send exome reanalysis to see if there have been any new gene discoveries that might provide a genetic diagnosis for Yi.  This Yi provided informed consent for the testing, signed with verbal consent (COVID-19).      Previous genetic test results were reviewed  Previous exome data will be collected and sent to [facility] for   No orders of the defined types were placed in this encounter.    After testing is initiated, results will be returned by phone in ~4 months and we will schedule a follow-up appointment according to Dr. Shipley.  Contact information was provided should any questions arise in the future.       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 She has had multiple surgeries on her hands. She was told in  that her condition was autosomal dominant when she went to [facility], but their family has been told more recently that this condition may skip generations so they sought further genetic testing to better understand their condition  "and to see if future generations may be affected even if they don't appear symptomatic. They are concerned that the condition is getting severe through the affected generations. Yi and her mom both have their left hand affected. Yi's son, along with two of her grandchildren have both hands and feet affected. She also has an unaffected grandchild. Genetic testing was performed (CMA and exome), which were non-diagnostic.    Yi denies personal history of delayed development, early onset hearing loss or foot malformation.   Yi denies any history of development issues or cognitive delays.   Yi got her doctorate in education.      Additional history is significant for:  Fibromyalgia, Muscle pain and Cervical stenosis of spinal canal   She has had spinal fusions. Patient has had multiple foot surgeries. She has 2nd toe amputations. She has a significant hallux valgus on the right   Recurrent episodes of otitis media with effusion on the right with successful closure of her air bone gap with well-healed tympanic membrane    Allergic rhinitis   Arthritis   Cataract  GERD  Keratoconjunctivitis sicca not specified as Sjogren's   Mitral valve prolapse  Osteoporosis, no longer needing fosamax  Hyperlipidemia  Hypertension  Raynaud phenomenon   Reflex sympathetic dystrophy left foot   Rosacea   HELENA  TMJ arthritis     Pregnancy/ History:  Yi was born via vaginal delivery  Pregnancy complications included \"blue at birth\" but did not need to stay longer a typical baby.        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  No results found for any visits on 23.    Past " Medical History:  No past medical history on file.       FAMILY HISTORY  A three generation pedigree was previously obtained and scanned into the EMR. See scanned pedigree in Media tab. Updates today: Granddaughter, Ashley, has had absence seizures with negative EEG/MRI.    DISCUSSION  We discussed that we can submit for a reanalysis of the exome sequencing data at [facility]. Reanalysis of the data obtained from the original testing is improved by the updated algorithms and variant databases since it was first analyzed. It can identify variants in newly-discovered genes. Yi is looking forward to reanalysis in the hopes of obtaining a diagnosis. We reviewed that the reason we are sending testing is to identify a genetic cause of the familial ectrodactyly, but it may identity a genetic cause for a non-ectordactyly feature.  Yi expressed understanding and provided informed consent for testing.  Potential results were reviewed.  Testing is free of charge (first time reanalysis).    Lab results may be automatically released via [facility].  Department protocol is to hold genetic testing results until we have reviewed them. We will then contact the family directly to disclose the results and ensure they receive a copy of the report. This protocol was reviewed with the family, who were in agreement to hold the results for genetics review and direct contact.          Kamilah Alonso [facility]  Genetic Counselor   [facility]   Phone: [phone]  Pager: [phone]          Approximate Time Spent in Consultation: 30 min          This note was written with the assistance of voice recognition software and may contain occasional typographic errors. Please contact our office if you identify errors requiring correction.

## 2024-04-24 ENCOUNTER — TELEPHONE (OUTPATIENT)
Dept: CONSULT | Facility: CLINIC | Age: 79
End: 2024-04-24
Payer: COMMERCIAL

## 2024-04-24 NOTE — TELEPHONE ENCOUNTER
Reason for Call  Called Yi to discuss the results of Yi's exome sequencing reanalysis, completed at [email or web address]. This was sent due to Yi's ectrodactyly.     Results  Exome sequencing reanalysis was completed at [email or web address]. No new variants were identified.     The copy number loss at 16q23.1q23.2 was identified in Yi and her son's sample, but the classification of the variant remains uncertain. It therefore does not provide a genetic diagnosis at this time for Yi or her son.     Nuclear DNA: negative  ACMG secondary findings: negative    Interpretation  No genetic diagnosis has been established that explains Yi's familial ectrodacyly. She has a 16q deletion of uncertain significance, which may be related, but further information about this variant is needed to determine if it is pathogenic or benign. Yi can call me to check in on the status of this variant in ~2 years.     Analysis of secondary findings as recommended by ACMG was performed. Examples include increased cancer risk due to variants in BRCA1 and BRCA2. No pathogenic variants in these genes were identified. This does not rule out the possibility of developing one of the related conditions, however, there is no increased risk based on these results. In addition to this, because no variants were identified in the proband, analysis was not performed on parents. If concerns for these conditions arises, it should be clinically evaluated. Genetic testing may be indicated.    We discussed that genome sequencing can be considered. This can look at the ectrodactyly genes in more detail (deeper intronic variants). It can also identify rearrangements/expansions that exome can miss. We can attempt insurance billing or use patient-pay pricing ($2100) with or without financial assistance (reduces cost to $850-$2100 depending on income/household size). Yi is interested in this testing      Plan  Messaged  to coordinate virtual GC appointment with myself,  60min, genetics context.   I will mail results to home alongside interpretation note above  No additional questions or concerns. Contact information provided    Kamilah Alonso [facility]  Genetic Counselor   [facility]   Phone: [phone]

## 2024-05-28 ENCOUNTER — TRANSFERRED RECORDS (OUTPATIENT)
Dept: HEALTH INFORMATION MANAGEMENT | Facility: CLINIC | Age: 79
End: 2024-05-28
Payer: COMMERCIAL

## 2024-06-01 ENCOUNTER — HEALTH MAINTENANCE LETTER (OUTPATIENT)
Age: 79
End: 2024-06-01

## 2024-06-13 NOTE — PROGRESS NOTES
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 A diagnosis of SNHL (sensorineural hearing loss) was also pertinent to this visit.    Yi was accompanied to this visit by her son, Don, and sister, Shasta.    History is obtained from Patient and electronic health record. I met with Yi to update history and offer exome reanalysis.    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  Exome sequencing was pursued at [email or web address], which was negative. 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 This was absent in her unaffected sister, Nedra. It was present in her son, Don. This deletion remains uncertain.  Exome sequencing and subsequent exome reanalysis was sent to see if there is a single gene cause for the familial ectrodactyly.  Both were nondiagnostic.  We therefore discussed sending Genome Sequencing as a neck step to see if there is a more complex rearrangement for deeper intronic variant that explains the familial ectrodactyly.  This testing is medically necessary as it could inform the cause of introductory, syndrome of health risks, and risks to relatives in the family. Yi and her relatives provided informed consent for the Genome Sequencing, signed with verbal consent (COVID-19).      Previous genetic test results were reviewed  Blood will be collected and sent to [facility] for genome sequencing. Blood samples for affected son and unaffected sister will be sent as well  Orders Placed This Encounter   Procedures    Other  Laboratory; [facility]; [facility] Whole Genome Analysis (test code ). DO NOT BILL PATIENT (Laboratory Miscellaneous Order)     After testing is initiated, results will be returned by phone in ~10-12 weeks and we will schedule a follow-up appointment according to Dr. Shipley.  Contact information was provided should any questions arise in the future.     Addendum 6/19/24  [facility] does not allow proband to opt in and relatives to opt out. Requested call back to discuss. In the interim, we have opted her out. I have called her son/sister to let them know/revisit their selection if they are interested.    Addendum 6/21/24  Talked to son.  He would like to opt out of secondary findings.  I therefore opted everybody out of secondary findings.    Don will also discuss with his mother.  Encouraged him/Yi to let me know if they would prefer to allow Yi to get secondary findings (and Don will not participate in testing at all). Fawn would also like to opt out.    Addendum 6/24/24  Received voicemail from Yi. She is OK opting out of secondary findings.    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 She has had multiple surgeries on her hands. She was told in 1968 that her condition was autosomal dominant when she went to [facility], but their family has been told more recently that this condition may skip generations so they sought further genetic testing to better understand their condition and to see if future generations may be affected even if they don't appear symptomatic. They are concerned that the condition is getting severe through the affected generations. Yi and her mom both have their left hand affected. Yi's son, along with two of her grandchildren have both hands and feet affected. She also has an unaffected grandchild. Genetic testing was performed (CMA and exome), which were non-diagnostic.    Yi denies personal history of delayed development, early  "onset hearing loss or foot malformation.   Yi denies any history of development issues or cognitive delays.   Yi got her doctorate in education.      Additional history is significant for:    Fibromyalgia, Muscle pain and Cervical stenosis of spinal canal   She has had spinal fusions. Patient has had multiple foot surgeries. She has 2nd toe amputations. She has a significant hallux valgus on the right   Recurrent episodes of otitis media with effusion on the right with successful closure of her air bone gap with well-healed tympanic membrane    Allergic rhinitis   Arthritis   Cataract  GERD  Keratoconjunctivitis sicca not specified as Sjogren's   Mitral valve prolapse  Osteoporosis, no longer needing fosamax  Hyperlipidemia  Hypertension  Raynaud phenomenon   Reflex sympathetic dystrophy left foot   Rosacea   HELENA  TMJ arthritis     Pregnancy/ History:  Yi was born via vaginal delivery  Pregnancy complications included \"blue at birth\" but did not need to stay longer a typical baby.      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 Absent in sister.    Exome sequencing (561a): negative (reanalysis 2024)    Imaging results:   No results found for this or any previous visit (from the past 744 hour(s)).  No results found for any visits on 24.    Past Medical History:  No past medical history on file.       FAMILY HISTORY  A three generation pedigree was previously obtained and scanned into the EMR. See scanned pedigree in Media tab. Updates today: none    DISCUSSION  Genetics  Today we reviewed that our genetic material or DNA is responsible for how our bodies grow and develop. It can be thought of as an " instruction book for the body.  In this instruction book there are individual genes which each have a specific function.  In the body there are approximately 25,000 genes, 6 of which are known to cause Wardenberg syndrome to date.  A gene is made at of multiple parts: exons and introns.  As a gene is processed to a protein, only be exons are used to make the final protein, and the introns are removed.  Because of this, the majority of human diseases are associated with genetic changes in the exons.    Sequencing  Yi has had exome sequencing genetic testing completed to date.  This can be thought of as reading through the letters of his genes. This analyzed the exons genes but did not evaluate for deeper intronic variants.  This testing was non-diagnostic, meaning no disease causing genetic variants were identified. These tests did not evaluate for deeper intronic variants.     Genome Sequencing  We could consider further genetic testing called genome sequencing.  This is similar to exome sequencing, but it sequenced far more DNA than exome sequencing.  It picks up more genetic variants then exome sequencing; genome is able to identify more intronic variants, more structural variants (deletions, duplications, insertions, inversion, and regions of homozygosity), mitochondrial DNA variants, and variants within repeat regions.  Genome is not a perfect test and it does not analyze every letter of the genome due to limited information about these regions and limitations in the technology. It also was not able to identify methylation differences and low level mosaicism. It can therefore miss genetic changes that could provide a diagnosis.      Turnaround time for testing is between 3 and 5 months on average.  Blood is a preferable sample for both parents and patient.  The potential results of this test include a positive, negative, uncertain, or unexpected result.    A positive result would mean that a genetic change was  identified which provides a diagnosis for Yi.  This change may or may not explain the entirety of his features.  We would have Yi return to clinic to discuss this diagnosis and any potential management changes.  Other relatives in the family can be tested for the same genetic change.  It can also provide recurrence risk information for future children and allow for reproductive genetic testing.  A negative result cannot exclude a genetic diagnosis altogether as her genetic testing is imperfect, but it would reduce the chance of a genetic diagnosis.  Follow-up in genetics may still be recommended. As her genetic testing improves over time, further testing may be offered.  Uncertain results means we found a genetic change, but we are unsure if it affects the way the gene is working the body.  It may be benign human variation, or it may cause the gene to malfunction and lead to a genetic diagnosis.  At this time we lack information to say with certainty, but we would continue to follow-up on this uncertain results over time.  It may be beneficial to test other individuals in the family to clarify the meaning of this result  An unexpected result is possible with any genetic test.  Examples include finding that the patient or a parent is a carrier for a genetic condition, identifying a genetic diagnosis in the patient or a parent which is loosely related to the patient's clinical presentation, or identifying that a reported relative who participated in testing is not the biological relative of the patient (e.g. nonpaternity).    Familial Samples  We discussed that samples from Yi's family will be included in the analysis to help determine if gene changes that are found are disease causing or benign.  Only changes that are found in Yi that may contributed to her symptoms will be tested for in her relatives and only gene changes that the laboratory believes may contribute to Yi's symptoms will be reported.  Genetic testing in relatives can lead to diagnoses, carrier status, or reveal family relationships (e.g. nonpaternity). Changes and variants in genes that are not thought to contribute to Yi's symptoms will not be included in the results report and will not be tested for in her relatives.   We will include the following family members:  Nedra CORREA 52  Yuriy Cazares 1978  AC Secondary Findings  We reviewed that the lab can report the results of gene mutations that are found in genes recommended by the American College of Medical Genetics and Genomics (ACMG) to be reported to genome patients even if the gene variant does not contribute to their current symptoms.  Many of these gene changes may not be associated with symptoms until adulthood and are not traditionally tested for in children, but may lead to medical management changes. Examples include genes related to increased cancer risk, heart conditions, and metabolic conditions. In addition, relative status for a change in one of the secondary findings genes may sought from Yi's results.    We discussed that there are insurance implications related to these findings in terms of life, short term disability, and long term disability insurance. There is a federal law in place at the moment, The Genetic Information Nondiscrimination Act or PINA (2008) that protects again health insurance discrimination.  Health insurance protections do not apply to members of the [place]  who receive care through [employer], Veterans receiving care through the VA, the [facility], or federal employees who receive care through Federal Employees Health Benefits Plan. Employers may not discriminate (hiring, firing, promotions etc.), based on genetic information. This only applies to companies with 15 or more employees. It does not apply to federal employees, or , which have their own nondiscrimination protections in place. Employers may  "have \"voluntary\" health services such as employee wellness programs that request genetic information or family history, which is not a violation of PINA.   At this time, Yi accepted the results from the ACMG secondary findings and incidental findings. Her son and sister opted out.    Pharmacogenomic Results  Some people harbor genetic variants in genes that metabolize medications. These are called pharmacogenomic variants. As part of the genetic testing, the lab can analyze for pharmacogenomic variants that may impact medication choices/dosing. Work from the National Institutes of Health Undiagnosed Diseases Program found that each subject within their cohort had at least 1 pharmacogenomic-related finding and ~1% had a finding related to a medication that they were currently taking. Importantly, many individuals find there is limited clinical utility because pharmacogenomic testing will not guarantee that someone will find a medication to resolve a health issue or perfect dosing. It may help reduce the \"trial-and-error\" period to find the best medication, especially early in the process. It is most helpful for anti-coagulants and some chemotherapy medications. The genetics department does not interpret these results. If the patient elects to receive these results, they will be referred to a specializing pharmacist for results interpretation.  At this time, Yi accepted pharmacogenomic results. We will refer her to [facility] pharmacist for interpretation.    Research studies  At this time, the family accepted being contacted for research studies.    Benefits Investigation and Initiating Testing  A blood sample will be collected and sent to [facility].     We can attempt insurance billing or use patient-pay pricing ($2100) with or without financial assistance (reduces cost to $850-$2100 depending on income/household size).       Lab results may be automatically released via [facility].  Department protocol is to hold genetic " testing results until we have reviewed them. We will then contact the family directly to disclose the results and ensure they receive a copy of the report. This protocol was reviewed with the family, who were in agreement to hold the results for genetics review and direct contact.          Kamilah Alonso [facility]  Genetic Counselor   [facility]   Phone: [phone]  Pager: [phone]          Approximate Time Spent in Consultation: 43 min          This note was written with the assistance of voice recognition software and may contain occasional typographic errors. Please contact our office if you identify errors requiring correction.

## 2024-06-14 ENCOUNTER — TRANSFERRED RECORDS (OUTPATIENT)
Dept: HEALTH INFORMATION MANAGEMENT | Facility: CLINIC | Age: 79
End: 2024-06-14

## 2024-06-14 ENCOUNTER — VIRTUAL VISIT (OUTPATIENT)
Dept: CONSULT | Facility: CLINIC | Age: 79
End: 2024-06-14
Attending: GENETIC COUNSELOR, MS
Payer: COMMERCIAL

## 2024-06-14 DIAGNOSIS — H90.5 SNHL (SENSORINEURAL HEARING LOSS): ICD-10-CM

## 2024-06-14 DIAGNOSIS — Z84.89 FAMILY HISTORY OF GENETIC DISORDER: ICD-10-CM

## 2024-06-14 DIAGNOSIS — Q73.0 ECTRODACTYLY: Primary | ICD-10-CM

## 2024-06-14 PROCEDURE — 96040 HC GENETIC COUNSELING, EACH 30 MINUTES: CPT | Mod: GT,95 | Performed by: GENETIC COUNSELOR, MS

## 2024-06-18 ENCOUNTER — LAB (OUTPATIENT)
Dept: LAB | Facility: CLINIC | Age: 79
End: 2024-06-18
Payer: COMMERCIAL

## 2024-06-18 DIAGNOSIS — Q73.0 ECTRODACTYLY: ICD-10-CM

## 2024-06-18 DIAGNOSIS — H90.5 SNHL (SENSORINEURAL HEARING LOSS): ICD-10-CM

## 2024-06-18 PROCEDURE — 36415 COLL VENOUS BLD VENIPUNCTURE: CPT

## 2024-09-17 LAB — SCANNED LAB RESULT: NORMAL

## 2024-09-19 ENCOUNTER — TELEPHONE (OUTPATIENT)
Dept: CONSULT | Facility: CLINIC | Age: 79
End: 2024-09-19
Payer: COMMERCIAL

## 2024-09-19 NOTE — TELEPHONE ENCOUNTER
Reason for Call  I received a call back from Yi to discuss the results of her genetic testing.    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 Although this is the most comprehensive test clinically available, it still has significant limitations as we continue to learn more about genetics. It is possible that Yi's features are caused by a genetic change not identified by this test. We may recommend reanalysis of Yi's data in the future to attempt to identify a newly discovered genetic change.     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 It is still classified as a variant of uncertain significance. In the future, we may gain additional information about this deletion that may help us better understand whether or not this change is contributing to Yi's concerns.      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 This means that part of Yi's 6th chromosomes were too similar to each other. Regions of homozygosity do not cause health problems themselves, but they do demonstrate a greater risk for Yi to have a recessive condition. Recessive conditions occur when both copies of a person's gene have pathogenic variants in them. Because of Yi's otherwise normal  genome sequencing, the likelihood of her having a recessive disorder in this region is unlikely. This is therefore an incidental finding that does not explain Yi's concerns. Of note, this was not reported in Yi's previous genetic testing because [facility] only reports regions of homozygosity when at least one region of homozygosity is >10 Mb or two regions  are each >8 Mb are identified. Yi's single region of 6.42 Mb falls below this reportable range.     Analysis of secondary findings as recommended by [facility] was performed. Examples include increased cancer risk due to variants in BRCA1 and BRCA2. No pathogenic variants in these genes were identified. This does not rule out the possibility of developing one of the related conditions, however, there is no increased risk based on these results. In addition to this, because no variants were identified in the proband, analysis was not performed on parents. If concerns for these conditions arises, it should be clinically evaluated. Genetic testing may be indicated.    Follow-Up  We will be in touch with Yi once we have follow-up recommendations from Dr. Shipley. We will send a copy of Yi's results to her through [employer] and in the mail.       Kellie Warren MS, Located within [facility]  Genetic Counselor  [facility]  Phone: [phone]

## 2024-09-19 NOTE — TELEPHONE ENCOUNTER
I called Yi to discuss the results of her genetic testing. I left a message asking iY to call me back.        Kellie Warren MS, [facility]  Genetic Counselor   [place]  Phone: [phone]

## 2025-05-22 ENCOUNTER — TELEPHONE (OUTPATIENT)
Dept: CONSULT | Facility: CLINIC | Age: 80
End: 2025-05-22
Payer: COMMERCIAL

## 2025-05-22 NOTE — TELEPHONE ENCOUNTER
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 We reviewed that the deletion is uncertain, so we do not know if it causes health issues overall. If it is a genetic diagnosis, we do not know what the clinical features overall are, at this time. Lastly, the knee/back issues and allergies might be coincidental to her ectrodactyly. We will update her if there is a variant update in future.    Kamilah Alonso, [facility]  Genetic Counselor   [facility]   Phone: [phone]

## 2025-06-14 ENCOUNTER — HEALTH MAINTENANCE LETTER (OUTPATIENT)
Age: 80
End: 2025-06-14